# Patient Record
Sex: MALE | Race: WHITE | NOT HISPANIC OR LATINO | Employment: FULL TIME | URBAN - METROPOLITAN AREA
[De-identification: names, ages, dates, MRNs, and addresses within clinical notes are randomized per-mention and may not be internally consistent; named-entity substitution may affect disease eponyms.]

---

## 2023-12-26 LAB
BASOPHILS # BLD AUTO: 0 10E3/UL (ref 0–0.2)
BASOPHILS NFR BLD AUTO: 0 %
EOSINOPHIL # BLD AUTO: 0 10E3/UL (ref 0–0.7)
EOSINOPHIL NFR BLD AUTO: 0 %
ERYTHROCYTE [DISTWIDTH] IN BLOOD BY AUTOMATED COUNT: 11.8 % (ref 10–15)
HCT VFR BLD AUTO: 49.5 % (ref 40–53)
HGB BLD-MCNC: 17.3 G/DL (ref 13.3–17.7)
IMM GRANULOCYTES # BLD: 0 10E3/UL
IMM GRANULOCYTES NFR BLD: 0 %
LIPASE SERPL-CCNC: 14 U/L (ref 13–60)
LYMPHOCYTES # BLD AUTO: 1.6 10E3/UL (ref 0.8–5.3)
LYMPHOCYTES NFR BLD AUTO: 13 %
MCH RBC QN AUTO: 30.4 PG (ref 26.5–33)
MCHC RBC AUTO-ENTMCNC: 34.9 G/DL (ref 31.5–36.5)
MCV RBC AUTO: 87 FL (ref 78–100)
MONOCYTES # BLD AUTO: 1.3 10E3/UL (ref 0–1.3)
MONOCYTES NFR BLD AUTO: 11 %
NEUTROPHILS # BLD AUTO: 9.1 10E3/UL (ref 1.6–8.3)
NEUTROPHILS NFR BLD AUTO: 76 %
NRBC # BLD AUTO: 0 10E3/UL
NRBC BLD AUTO-RTO: 0 /100
PLATELET # BLD AUTO: 240 10E3/UL (ref 150–450)
RBC # BLD AUTO: 5.69 10E6/UL (ref 4.4–5.9)
WBC # BLD AUTO: 12.1 10E3/UL (ref 4–11)

## 2023-12-26 PROCEDURE — 83690 ASSAY OF LIPASE: CPT | Performed by: EMERGENCY MEDICINE

## 2023-12-26 PROCEDURE — 85048 AUTOMATED LEUKOCYTE COUNT: CPT | Performed by: EMERGENCY MEDICINE

## 2023-12-26 PROCEDURE — 36415 COLL VENOUS BLD VENIPUNCTURE: CPT | Performed by: EMERGENCY MEDICINE

## 2023-12-26 PROCEDURE — 250N000011 HC RX IP 250 OP 636: Performed by: EMERGENCY MEDICINE

## 2023-12-26 PROCEDURE — 80053 COMPREHEN METABOLIC PANEL: CPT | Performed by: EMERGENCY MEDICINE

## 2023-12-26 PROCEDURE — 96374 THER/PROPH/DIAG INJ IV PUSH: CPT | Mod: 59

## 2023-12-26 PROCEDURE — 99285 EMERGENCY DEPT VISIT HI MDM: CPT | Mod: 25

## 2023-12-26 PROCEDURE — 85025 COMPLETE CBC W/AUTO DIFF WBC: CPT | Performed by: EMERGENCY MEDICINE

## 2023-12-26 RX ORDER — ONDANSETRON 2 MG/ML
4 INJECTION INTRAMUSCULAR; INTRAVENOUS ONCE
Status: COMPLETED | OUTPATIENT
Start: 2023-12-26 | End: 2023-12-26

## 2023-12-26 RX ADMIN — ONDANSETRON 4 MG: 2 INJECTION INTRAMUSCULAR; INTRAVENOUS at 23:22

## 2023-12-27 ENCOUNTER — APPOINTMENT (OUTPATIENT)
Dept: CT IMAGING | Facility: CLINIC | Age: 33
End: 2023-12-27
Attending: EMERGENCY MEDICINE
Payer: COMMERCIAL

## 2023-12-27 ENCOUNTER — HOSPITAL ENCOUNTER (OUTPATIENT)
Facility: CLINIC | Age: 33
Setting detail: OBSERVATION
Discharge: HOME | End: 2023-12-28
Attending: EMERGENCY MEDICINE | Admitting: INTERNAL MEDICINE
Payer: COMMERCIAL

## 2023-12-27 DIAGNOSIS — K56.609 SBO (SMALL BOWEL OBSTRUCTION) (H): ICD-10-CM

## 2023-12-27 LAB
ALBUMIN SERPL BCG-MCNC: 4.6 G/DL (ref 3.5–5.2)
ALP SERPL-CCNC: 66 U/L (ref 40–150)
ALT SERPL W P-5'-P-CCNC: 22 U/L (ref 0–70)
ANION GAP SERPL CALCULATED.3IONS-SCNC: 13 MMOL/L (ref 7–15)
AST SERPL W P-5'-P-CCNC: 33 U/L (ref 0–45)
BILIRUB SERPL-MCNC: 0.7 MG/DL
BUN SERPL-MCNC: 14.5 MG/DL (ref 6–20)
CALCIUM SERPL-MCNC: 9.6 MG/DL (ref 8.6–10)
CHLORIDE SERPL-SCNC: 96 MMOL/L (ref 98–107)
CREAT SERPL-MCNC: 0.88 MG/DL (ref 0.67–1.17)
DEPRECATED HCO3 PLAS-SCNC: 27 MMOL/L (ref 22–29)
EGFRCR SERPLBLD CKD-EPI 2021: >90 ML/MIN/1.73M2
FLUAV RNA SPEC QL NAA+PROBE: NEGATIVE
FLUBV RNA RESP QL NAA+PROBE: NEGATIVE
GLUCOSE SERPL-MCNC: 104 MG/DL (ref 70–99)
HCO3 BLDV-SCNC: 28 MMOL/L (ref 21–28)
HOLD SPECIMEN: NORMAL
HOLD SPECIMEN: NORMAL
LACTATE BLD-SCNC: 1.6 MMOL/L
PCO2 BLDV: 53 MM HG (ref 40–50)
PH BLDV: 7.34 [PH] (ref 7.32–7.43)
PO2 BLDV: 24 MM HG (ref 25–47)
POTASSIUM SERPL-SCNC: 4.4 MMOL/L (ref 3.4–5.3)
PROT SERPL-MCNC: 7.8 G/DL (ref 6.4–8.3)
RSV RNA SPEC NAA+PROBE: NEGATIVE
SAO2 % BLDV: 39 % (ref 94–100)
SARS-COV-2 RNA RESP QL NAA+PROBE: NEGATIVE
SODIUM SERPL-SCNC: 136 MMOL/L (ref 135–145)

## 2023-12-27 PROCEDURE — 96361 HYDRATE IV INFUSION ADD-ON: CPT

## 2023-12-27 PROCEDURE — 120N000004 HC R&B MS OVERFLOW

## 2023-12-27 PROCEDURE — 96376 TX/PRO/DX INJ SAME DRUG ADON: CPT

## 2023-12-27 PROCEDURE — 258N000003 HC RX IP 258 OP 636: Performed by: INTERNAL MEDICINE

## 2023-12-27 PROCEDURE — 250N000011 HC RX IP 250 OP 636: Performed by: EMERGENCY MEDICINE

## 2023-12-27 PROCEDURE — 96375 TX/PRO/DX INJ NEW DRUG ADDON: CPT

## 2023-12-27 PROCEDURE — 99221 1ST HOSP IP/OBS SF/LOW 40: CPT | Performed by: INTERNAL MEDICINE

## 2023-12-27 PROCEDURE — 87637 SARSCOV2&INF A&B&RSV AMP PRB: CPT | Performed by: EMERGENCY MEDICINE

## 2023-12-27 PROCEDURE — 99222 1ST HOSP IP/OBS MODERATE 55: CPT | Performed by: SURGERY

## 2023-12-27 PROCEDURE — 250N000009 HC RX 250: Performed by: EMERGENCY MEDICINE

## 2023-12-27 PROCEDURE — 82803 BLOOD GASES ANY COMBINATION: CPT

## 2023-12-27 PROCEDURE — 99207 PR NO BILLABLE SERVICE THIS VISIT: CPT | Performed by: INTERNAL MEDICINE

## 2023-12-27 PROCEDURE — 258N000003 HC RX IP 258 OP 636: Performed by: EMERGENCY MEDICINE

## 2023-12-27 PROCEDURE — 74177 CT ABD & PELVIS W/CONTRAST: CPT

## 2023-12-27 RX ORDER — IBUPROFEN 200 MG
200 TABLET ORAL PRN
COMMUNITY

## 2023-12-27 RX ORDER — ACETAMINOPHEN 325 MG/1
650 TABLET ORAL EVERY 4 HOURS PRN
Status: DISCONTINUED | OUTPATIENT
Start: 2023-12-27 | End: 2023-12-28 | Stop reason: HOSPADM

## 2023-12-27 RX ORDER — PROCHLORPERAZINE MALEATE 10 MG
10 TABLET ORAL EVERY 6 HOURS PRN
Status: DISCONTINUED | OUTPATIENT
Start: 2023-12-27 | End: 2023-12-28 | Stop reason: HOSPADM

## 2023-12-27 RX ORDER — MORPHINE SULFATE 2 MG/ML
2 INJECTION, SOLUTION INTRAMUSCULAR; INTRAVENOUS
Status: DISCONTINUED | OUTPATIENT
Start: 2023-12-27 | End: 2023-12-28 | Stop reason: HOSPADM

## 2023-12-27 RX ORDER — CALCIUM CARBONATE 500 MG/1
1000 TABLET, CHEWABLE ORAL 2 TIMES DAILY PRN
Status: DISCONTINUED | OUTPATIENT
Start: 2023-12-27 | End: 2023-12-28 | Stop reason: HOSPADM

## 2023-12-27 RX ORDER — NALOXONE HYDROCHLORIDE 0.4 MG/ML
0.4 INJECTION, SOLUTION INTRAMUSCULAR; INTRAVENOUS; SUBCUTANEOUS
Status: DISCONTINUED | OUTPATIENT
Start: 2023-12-27 | End: 2023-12-28 | Stop reason: HOSPADM

## 2023-12-27 RX ORDER — AMOXICILLIN 250 MG
2 CAPSULE ORAL 2 TIMES DAILY PRN
Status: DISCONTINUED | OUTPATIENT
Start: 2023-12-27 | End: 2023-12-28 | Stop reason: HOSPADM

## 2023-12-27 RX ORDER — ONDANSETRON 2 MG/ML
4 INJECTION INTRAMUSCULAR; INTRAVENOUS EVERY 6 HOURS PRN
Status: DISCONTINUED | OUTPATIENT
Start: 2023-12-27 | End: 2023-12-27

## 2023-12-27 RX ORDER — OXYCODONE HYDROCHLORIDE 5 MG/1
5 TABLET ORAL EVERY 4 HOURS PRN
Status: DISCONTINUED | OUTPATIENT
Start: 2023-12-27 | End: 2023-12-28 | Stop reason: HOSPADM

## 2023-12-27 RX ORDER — SODIUM CHLORIDE 9 MG/ML
INJECTION, SOLUTION INTRAVENOUS CONTINUOUS
Status: DISCONTINUED | OUTPATIENT
Start: 2023-12-27 | End: 2023-12-27

## 2023-12-27 RX ORDER — HYDROMORPHONE HYDROCHLORIDE 1 MG/ML
0.3 INJECTION, SOLUTION INTRAMUSCULAR; INTRAVENOUS; SUBCUTANEOUS ONCE
Status: COMPLETED | OUTPATIENT
Start: 2023-12-27 | End: 2023-12-27

## 2023-12-27 RX ORDER — MORPHINE SULFATE 2 MG/ML
1 INJECTION, SOLUTION INTRAMUSCULAR; INTRAVENOUS
Status: DISCONTINUED | OUTPATIENT
Start: 2023-12-27 | End: 2023-12-28 | Stop reason: HOSPADM

## 2023-12-27 RX ORDER — ONDANSETRON 2 MG/ML
4 INJECTION INTRAMUSCULAR; INTRAVENOUS ONCE
Status: COMPLETED | OUTPATIENT
Start: 2023-12-27 | End: 2023-12-27

## 2023-12-27 RX ORDER — IOPAMIDOL 755 MG/ML
500 INJECTION, SOLUTION INTRAVASCULAR ONCE
Status: COMPLETED | OUTPATIENT
Start: 2023-12-27 | End: 2023-12-27

## 2023-12-27 RX ORDER — ONDANSETRON 4 MG/1
4 TABLET, ORALLY DISINTEGRATING ORAL EVERY 6 HOURS PRN
Status: DISCONTINUED | OUTPATIENT
Start: 2023-12-27 | End: 2023-12-28 | Stop reason: HOSPADM

## 2023-12-27 RX ORDER — NALOXONE HYDROCHLORIDE 0.4 MG/ML
0.2 INJECTION, SOLUTION INTRAMUSCULAR; INTRAVENOUS; SUBCUTANEOUS
Status: DISCONTINUED | OUTPATIENT
Start: 2023-12-27 | End: 2023-12-28 | Stop reason: HOSPADM

## 2023-12-27 RX ORDER — PROCHLORPERAZINE 25 MG
25 SUPPOSITORY, RECTAL RECTAL EVERY 12 HOURS PRN
Status: DISCONTINUED | OUTPATIENT
Start: 2023-12-27 | End: 2023-12-28 | Stop reason: HOSPADM

## 2023-12-27 RX ORDER — ONDANSETRON 2 MG/ML
4 INJECTION INTRAMUSCULAR; INTRAVENOUS EVERY 6 HOURS PRN
Status: DISCONTINUED | OUTPATIENT
Start: 2023-12-27 | End: 2023-12-28 | Stop reason: HOSPADM

## 2023-12-27 RX ORDER — HYDROMORPHONE HYDROCHLORIDE 1 MG/ML
0.3 INJECTION, SOLUTION INTRAMUSCULAR; INTRAVENOUS; SUBCUTANEOUS
Status: DISCONTINUED | OUTPATIENT
Start: 2023-12-27 | End: 2023-12-27

## 2023-12-27 RX ORDER — POLYETHYLENE GLYCOL 3350 17 G/17G
17 POWDER, FOR SOLUTION ORAL DAILY
Status: DISCONTINUED | OUTPATIENT
Start: 2023-12-27 | End: 2023-12-28 | Stop reason: HOSPADM

## 2023-12-27 RX ORDER — LIDOCAINE 40 MG/G
CREAM TOPICAL
Status: DISCONTINUED | OUTPATIENT
Start: 2023-12-27 | End: 2023-12-28 | Stop reason: HOSPADM

## 2023-12-27 RX ORDER — AMOXICILLIN 250 MG
1 CAPSULE ORAL 2 TIMES DAILY PRN
Status: DISCONTINUED | OUTPATIENT
Start: 2023-12-27 | End: 2023-12-28 | Stop reason: HOSPADM

## 2023-12-27 RX ORDER — HYDROMORPHONE HYDROCHLORIDE 1 MG/ML
0.5 INJECTION, SOLUTION INTRAMUSCULAR; INTRAVENOUS; SUBCUTANEOUS ONCE
Status: DISCONTINUED | OUTPATIENT
Start: 2023-12-27 | End: 2023-12-27

## 2023-12-27 RX ADMIN — SODIUM CHLORIDE: 9 INJECTION, SOLUTION INTRAVENOUS at 08:40

## 2023-12-27 RX ADMIN — SODIUM CHLORIDE 1000 ML: 9 INJECTION, SOLUTION INTRAVENOUS at 04:07

## 2023-12-27 RX ADMIN — HYDROMORPHONE HYDROCHLORIDE 0.3 MG: 1 INJECTION, SOLUTION INTRAMUSCULAR; INTRAVENOUS; SUBCUTANEOUS at 06:16

## 2023-12-27 RX ADMIN — SODIUM CHLORIDE 56 ML: 9 INJECTION, SOLUTION INTRAVENOUS at 04:37

## 2023-12-27 RX ADMIN — IOPAMIDOL 100 ML: 755 INJECTION, SOLUTION INTRAVENOUS at 04:37

## 2023-12-27 RX ADMIN — FAMOTIDINE 20 MG: 10 INJECTION, SOLUTION INTRAVENOUS at 04:04

## 2023-12-27 RX ADMIN — ONDANSETRON 4 MG: 2 INJECTION INTRAMUSCULAR; INTRAVENOUS at 04:14

## 2023-12-27 RX ADMIN — HYDROMORPHONE HYDROCHLORIDE 0.3 MG: 1 INJECTION, SOLUTION INTRAMUSCULAR; INTRAVENOUS; SUBCUTANEOUS at 04:14

## 2023-12-27 ASSESSMENT — ACTIVITIES OF DAILY LIVING (ADL)
ADLS_ACUITY_SCORE: 35

## 2023-12-27 NOTE — PROGRESS NOTES
Patient seen and examined.  Chart reviewed.  Please see admission history and physical by Dr. Roberts from earlier today for details.

## 2023-12-27 NOTE — CONSULTS
Chief complaint:  Abdominal pain    HPI:  This patient is a 33 year old male who presents with abdominal pain over the last couple of days.  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  He has been doing generally well, and is not aware of any previously diagnosed small bowel obstructions.  He has had a couple of episodes of pain which resolved on their own.  The patient now states that he has had a bowel movement and passed gas.  His pain has essentially resolved.    Past Medical History:   has a past medical history of Congenital malrotation of intestine (H28) and Gastroesophageal reflux disease without esophagitis.    Past Surgical History:  Past Surgical History:   Procedure Laterality Date    ABDOMEN SURGERY      congenital malrotation of the bowel repaired as an infant        Social History:  Social History     Socioeconomic History    Marital status:      Spouse name: Not on file    Number of children: Not on file    Years of education: Not on file    Highest education level: Not on file   Occupational History    Not on file   Tobacco Use    Smoking status: Never    Smokeless tobacco: Not on file   Substance and Sexual Activity    Alcohol use: Yes     Comment: occasionally    Drug use: Not on file    Sexual activity: Not on file   Other Topics Concern    Not on file   Social History Narrative    Not on file     Social Determinants of Health     Financial Resource Strain: Not on file   Food Insecurity: Not on file   Transportation Needs: Not on file   Physical Activity: Not on file   Stress: Not on file   Social Connections: Not on file   Interpersonal Safety: Not on file   Housing Stability: Not on file        Family History:  Family History   Problem Relation Age of Onset    Intestinal malrotation Father        Review of Systems:  The 10 point Review of Systems is negative other than noted in the HPI and above.    Physical Exam:  General - This is a well  developed, well nourished male in no apparent distress.  HEENT - Normocephalic, atraumatic.  No scleral icterus.  Neck - supple without masses  Lungs - clear to ascultation.    Heart - Regular rate & rhythm without murmur  Abdomen:   soft, non-distended without significant tenderness.  Bowel sounds are slightly high-pitched.    Extremities - warm without edema  Neurologic - nonfocal    Relevant labs:  White blood cell count 12,100.  Hemoglobin 17.3.    Imaging:  CT scan shows findings consistent with a small bowel obstruction.  Malrotation is evident.  There is a concern for possible internal hernia.    Assessment and Plan:  This is a patient with presenting symptoms of small bowel obstruction which now seems to have resolved.  We discussed the option of diagnostic laparoscopy, evaluation with Gastrografin challenge or starting a clear liquid diet.  The patient's preference is to start a liquid diet in the hopes of advancing to at least full liquids by tomorrow.  If that is the case, he should hopefully be able to discharge tomorrow.  We will follow the patient with you.  We did discuss trying to avoid very high residue foods such as corn and black beans in the near future.      Damir Schaefer MD  [facility]

## 2023-12-27 NOTE — ED NOTES
VSS, patient denies N/V at this time. Last emesis 2200 last night. Pain 0/10. Last dilaudid 0616. Patient reports he passed gas at 0800 today. Patient remains NPO except ice chips. NS running at 100ml/hr.

## 2023-12-27 NOTE — ED NOTES
"[facility]  ED Nurse Handoff Report    ED Chief complaint: Abdominal Pain and Vomiting  . ED Diagnosis:   Final diagnoses:   SBO (small bowel obstruction) (H)       Allergies: No Known Allergies    Code Status: Full Code    Activity level - Baseline/Home:  independent.  Activity Level - Current:   independent.   Lift room needed: No.   Bariatric: No   Needed: No   Isolation: No.   Infection: Not Applicable.     Respiratory status: Room air    Vital Signs (within 30 minutes):   Vitals:    12/26/23 2312 12/27/23 0400 12/27/23 0416   BP: 130/89 (!) 134/93 (!) 127/92   BP Location: Right arm     Pulse: 75 61    Resp: 18     Temp: 98.4  F (36.9  C)     TempSrc: Oral     SpO2: 100% 100% 99%   Weight: 99.8 kg (220 lb)     Height: 1.854 m (6' 1\")         Cardiac Rhythm:  ,      Pain level:    Patient confused: No.   Patient Falls Risk: patient and family education.   Elimination Status: Has voided     Patient Report - Initial Complaint: Epigastric pain, N/V.   Focused Assessment: Abdominal Pain and Vomiting        HPI   Raji Barker is a 33 year old male presenting with predominantly complaints of abdominal pain and vomiting.  Patient reports for roughly the past 2 days having epigastric pain that has become more generalized.  He describes the pain as more aching and cramping.  He notes taking Tylenol, Tums, Pepto-Bismol and initiating omeprazole twice daily yesterday though without significant improvements.  He reports accompanying nausea and nonbilious, nonbloody emesis though reports roughly 30 episodes of emesis since onset of symptoms.  He denies any fever, chills, sore throat, cough, dyspnea, chest pain, diarrhea, constipation, urinary or testicular pain.  Patient does admit to using NSAIDs roughly 3 times a week over the past month though denies any history of PUD, daily alcohol use, sick contacts, suspicious foods or gallstones.      Abnormal Results:   Labs Ordered and Resulted " from Time of ED Arrival to Time of ED Departure   COMPREHENSIVE METABOLIC PANEL - Abnormal       Result Value    Sodium 136      Potassium 4.4      Carbon Dioxide (CO2) 27      Anion Gap 13      Urea Nitrogen 14.5      Creatinine 0.88      GFR Estimate >90      Calcium 9.6      Chloride 96 (*)     Glucose 104 (*)     Alkaline Phosphatase 66      AST 33      ALT 22      Protein Total 7.8      Albumin 4.6      Bilirubin Total 0.7     CBC WITH PLATELETS AND DIFFERENTIAL - Abnormal    WBC Count 12.1 (*)     RBC Count 5.69      Hemoglobin 17.3      Hematocrit 49.5      MCV 87      MCH 30.4      MCHC 34.9      RDW 11.8      Platelet Count 240      % Neutrophils 76      % Lymphocytes 13      % Monocytes 11      % Eosinophils 0      % Basophils 0      % Immature Granulocytes 0      NRBCs per 100 WBC 0      Absolute Neutrophils 9.1 (*)     Absolute Lymphocytes 1.6      Absolute Monocytes 1.3      Absolute Eosinophils 0.0      Absolute Basophils 0.0      Absolute Immature Granulocytes 0.0      Absolute NRBCs 0.0     ISTAT GASES LACTATE VENOUS POCT - Abnormal    Lactic Acid POCT 1.6      Bicarbonate Venous POCT 28      O2 Sat, Venous POCT 39 (*)     pCO2 Venous POCT 53 (*)     pH Venous POCT 7.34      pO2 Venous POCT 24 (*)    LIPASE - Normal    Lipase 14     INFLUENZA A/B, RSV, & SARS-COV2 PCR - Normal    Influenza A PCR Negative      Influenza B PCR Negative      RSV PCR Negative      SARS CoV2 PCR Negative     ISTAT GASES LACTATE VENOUS POCT        CT Abdomen Pelvis w Contrast   Final Result   IMPRESSION:    Findings consistent with small bowel obstruction which may be on the basis of an internal hernia. There is evidence for underlying congenital malrotation of bowel. Mesenteric edema and small amount of ascites. No perforation or abscess.      I discussed the findings with Dr. Barnett of the ED at 5:15 AM on 12/27/2023.           Treatments provided: See MAR  Family Comments: N/A  OBS brochure/video discussed/provided to  patient:  N/A  ED Medications:   Medications   ondansetron (ZOFRAN) injection 4 mg (has no administration in time range)   HYDROmorphone (PF) (DILAUDID) injection 0.3 mg (has no administration in time range)   ondansetron (ZOFRAN) injection 4 mg (4 mg Intravenous $Given 12/26/23 2322)   famotidine (PEPCID) injection 20 mg (20 mg Intravenous $Given 12/27/23 0404)   ondansetron (ZOFRAN) injection 4 mg (4 mg Intravenous $Given 12/27/23 0414)   sodium chloride 0.9% BOLUS 1,000 mL (0 mLs Intravenous Stopped 12/27/23 0610)   HYDROmorphone (PF) (DILAUDID) injection 0.3 mg (0.3 mg Intravenous $Given 12/27/23 0414)   CT Scan Flush (56 mLs Intravenous $Given 12/27/23 0437)   iopamidol (ISOVUE-370) solution 500 mL (100 mLs Intravenous $Given 12/27/23 0437)       Drips infusing:  No  For the majority of the shift this patient was Green.   Interventions performed were N/A.    Sepsis treatment initiated: No    Cares/treatment/interventions/medications to be completed following ED care: Pain management, continue to monitor.     ED Nurse Name: Marlon D Reyes, RN  6:12 AM     RECEIVING UNIT ED HANDOFF REVIEW    Above ED Nurse Handoff Report was reviewed: Yes  Reviewed by: Hue Benitez RN on December 27, 2023 at 8:58 AM

## 2023-12-27 NOTE — PHARMACY-ADMISSION MEDICATION HISTORY
Pharmacist Admission Medication History    Admission medication history is complete. The information provided in this note is only as accurate as the sources available at the time of the update.    Information Source(s): Patient via in-person    Pertinent Information: None    Changes made to PTA medication list:  Added: all meds  Deleted: None  Changed: None    Medication Affordability:  Not including over the counter (OTC) medications, was there a time in the past 3 months when you did not take your medications as prescribed because of cost?: No    Allergies reviewed with patient and updates made in EHR: yes    Medication History Completed By: Brea Stein [facility] 12/27/2023 10:39 AM    Prior to Admission medications    Medication Sig Last Dose Taking? Auth Provider Long Term End Date   ibuprofen (ADVIL/MOTRIN) 200 MG tablet Take 200 mg by mouth as needed for pain prn at prn Yes Unknown, Entered By History     omeprazole (PRILOSEC) 20 MG DR capsule Take 20 mg by mouth daily as needed prn at prn Yes Unknown, Entered By History

## 2023-12-27 NOTE — ED TRIAGE NOTES
Pt. Presents to ED with complaints of 2 days of epigastric pain with brown vomit. Able to keep only water down. Pt. Reports he had this happen to him before 3 weeks ago and several years ago. Denies fevers. Denies blood thinners but does take ibuprofen 2-3 times/week. Denies blood, blood clots, or coffee ground emesis. Denies diarrhea, reports normal stools.

## 2023-12-27 NOTE — ED PROVIDER NOTES
"  History     Chief Complaint:  Abdominal Pain and Vomiting       HPI   Raji Barker is a 33 year old male presenting with predominantly complaints of abdominal pain and vomiting.  Patient reports for roughly the past 2 days having epigastric pain that has become more generalized.  He describes the pain as more aching and cramping.  He notes taking Tylenol, Tums, Pepto-Bismol and initiating omeprazole twice daily yesterday though without significant improvements.  He reports accompanying nausea and nonbilious, nonbloody emesis though reports roughly 30 episodes of emesis since onset of symptoms.  He denies any fever, chills, sore throat, cough, dyspnea, chest pain, diarrhea, constipation, urinary or testicular pain.  Patient does admit to using NSAIDs roughly 3 times a week over the past month though denies any history of PUD, daily alcohol use, sick contacts, suspicious foods or gallstones.  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      Independent Historian:   None - Patient Only    Review of External Notes:   none       Medications:    No current outpatient medications on file.      Past Medical History:    Congenital intestinal malrotation    Past Surgical History:    Congenital intestinal malrotation surgery    Physical Exam   Patient Vitals for the past 24 hrs:   BP Temp Temp src Pulse Resp SpO2 Height Weight   12/26/23 2312 130/89 98.4  F (36.9  C) Oral 75 18 100 % 1.854 m (6' 1\") 99.8 kg (220 lb)        Physical Exam  Nursing note and vitals reviewed.  Constitutional: Well nourished.  Eyes: Conjunctiva normal.  Pupils are equal, round, and reactive to light.   ENT: Nose normal. Mucous membranes pink and moist.  No posterior oropharyngeal erythema/exudate. Uvula midline.   Neck: Normal range of motion.  CVS: Normal rate, regular rhythm.  Normal heart sounds.   Pulmonary: Lungs clear to auscultation bilaterally. No wheezes/rales/rhonchi.  GI: Abdomen soft. Mild generalized tenderness. No " rigidity or guarding.  No CVA tenderness  MSK: Moves all extremities  Neuro: Alert. Follows simple commands.  Skin: Skin is warm and dry. No rash noted.   Psychiatric: Normal affect.         Emergency Department Course     Imaging:  CT Abdomen Pelvis w Contrast   Final Result   IMPRESSION:    Findings consistent with small bowel obstruction which may be on the basis of an internal hernia. There is evidence for underlying congenital malrotation of bowel. Mesenteric edema and small amount of ascites. No perforation or abscess.      I discussed the findings with Dr. Barnett of the ED at 5:15 AM on 12/27/2023.              Laboratory:  Labs Ordered and Resulted from Time of ED Arrival to Time of ED Departure   COMPREHENSIVE METABOLIC PANEL - Abnormal       Result Value    Sodium 136      Potassium 4.4      Carbon Dioxide (CO2) 27      Anion Gap 13      Urea Nitrogen 14.5      Creatinine 0.88      GFR Estimate >90      Calcium 9.6      Chloride 96 (*)     Glucose 104 (*)     Alkaline Phosphatase 66      AST 33      ALT 22      Protein Total 7.8      Albumin 4.6      Bilirubin Total 0.7     CBC WITH PLATELETS AND DIFFERENTIAL - Abnormal    WBC Count 12.1 (*)     RBC Count 5.69      Hemoglobin 17.3      Hematocrit 49.5      MCV 87      MCH 30.4      MCHC 34.9      RDW 11.8      Platelet Count 240      % Neutrophils 76      % Lymphocytes 13      % Monocytes 11      % Eosinophils 0      % Basophils 0      % Immature Granulocytes 0      NRBCs per 100 WBC 0      Absolute Neutrophils 9.1 (*)     Absolute Lymphocytes 1.6      Absolute Monocytes 1.3      Absolute Eosinophils 0.0      Absolute Basophils 0.0      Absolute Immature Granulocytes 0.0      Absolute NRBCs 0.0     LIPASE - Normal    Lipase 14     INFLUENZA A/B, RSV, & SARS-COV2 PCR - Normal    Influenza A PCR Negative      Influenza B PCR Negative      RSV PCR Negative      SARS CoV2 PCR Negative          Emergency Department Course & Assessments:              Interventions:  Medications   HYDROmorphone (PF) (DILAUDID) injection 0.5 mg (has no administration in time range)   ondansetron (ZOFRAN) injection 4 mg (has no administration in time range)   sodium chloride 0.9% BOLUS 1,000 mL (1,000 mLs Intravenous $New Bag 12/27/23 0402)   ondansetron (ZOFRAN) injection 4 mg (4 mg Intravenous $Given 12/26/23 3565)   famotidine (PEPCID) injection 20 mg (20 mg Intravenous $Given 12/27/23 3987)            Consultations/Discussion of Management or Tests:  5:43 AM I spoke to general surgery, Dr. Hutchison who states no need for emergent surgaical intervention at this time  5:54 AM I spoke to Dr. Roberts, hospitalist      Social Determinants of Health affecting care:   None    Disposition:  The patient is admitted to Dr. Roberts    Impression & Plan      Medical Decision Making:  Patient is a 33-year-old male presenting with predominant complaints of abdominal pain and vomiting.  He is nontoxic on arrival.  Unfortunately he had a prolonged wait secondary to large patient volumes.  Labs with mild leukocytosis though lactate normal.  No profound anemia or significant electrolyte derangements.  LFTs/lipase normal.  He underwent a CT which does suggest concerns for small bowel obstruction.  He did not have any recurrent emesis during his time in the ED.  We have held off on NG tube placement at this point in time.  His pain was controlled.  I did speak to general surgery particularly given history of malrotation with questionable internal hernia though they do not feel emergent surgical intervention is warranted at this point in time and feel comfortable seeing patient upon consultation.  Patient is pending admission at this point in time and my partner will take the phone call.      Diagnosis:    ICD-10-CM    1. SBO (small bowel obstruction) (H)  K56.609            Discharge Medications:  New Prescriptions    No medications on file          12/27/2023   Michelle Barnett, DO           Michelle Barnett, DO  12/27/23 0554

## 2023-12-27 NOTE — H&P
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   2 days ago he had sudden onset of epigastric pain without radiation, followed by profuse n/v.  He tried sched APAP, tums, PPI without relief and symptoms persisted.  He had a normal bm yesterday am. No F/C     ED VSS and he is afebrile  CMP wnl x mildly low chloride.    CBC with leukocytosis 12.1 with neutrophilia    Covid/flu/rsv negative     CT A/P with contrast:    Findings consistent with small bowel obstruction which may be on the basis of an internal hernia. There is evidence for underlying congenital malrotation of bowel. Mesenteric edema and small amount of ascites. No perforation or abscess.     Dr Barnett spoke with Dr Li who recommended admission and he will see him this morning    Problem List:   SBO in the setting of prior repair of congenital malrotation of the intestine  ? Internal hernia   NG has not been placed and he is not nauseated.  Exam with moderate distension and minimal bowel sounds.  Can't recall the last time he passed gas.  Had normal bm yesterday am   -NPO x meds, will consider ice chips  -IVF/IV pain and nausea medications  -orders in for prn NG  -surgery consult        DVT Prophylaxis: Pneumatic Compression Devices  Code Status: Full Code  Functional Status: independent  Muniz: not needed  Access:   PIV            Time spent 40 minutes reviewing epic including notes/labs/prior hx, current medications.  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   2 days ago he had sudden onset of epigastric pain without radiation, followed by profuse n/v.  He tried sched APAP, tums, PPI without relief and symptoms persisted.  He had a normal bm yesterday am. No F/C     ED VSS and he is afebrile  CMP wnl x mildly low chloride.    CBC with leukocytosis 12.1 with neutrophilia    Covid/flu/rsv negative     CT A/P with contrast:    Findings consistent with small bowel obstruction which may be on the basis of an internal hernia. There is evidence for underlying congenital malrotation of bowel. Mesenteric edema and small amount of ascites. No perforation or abscess.     Dr Barnett spoke with Dr Li who recommended admission and he will see him this morning      The history is obtained in discussion with the ER provider Dr Barnett and the patient with excellent reliability      Epic and Care everywhere were extensively reviewed        Past Medical History:     Past Medical History:   Diagnosis Date    Congenital malrotation of intestine (H28)     repaired in the first week of life    Gastroesophageal reflux disease without esophagitis              Past Surgical History:     Past Surgical History:   Procedure Laterality Date    ABDOMEN SURGERY      congenital malrotation of the bowel repaired as an infant             Social History:     Social History     Tobacco Use    Smoking status: Never    Smokeless tobacco: Not on file   Substance Use Topics    Alcohol use: Yes     Comment: occasionally             Family History:     Family History   Problem Relation Age of Onset    Intestinal  "malrotation Father             Allergies:   No Known Allergies          Medications:   Otc nsaids for headaches a couple times per week  Otc PPI prn for gerd             Review of Systems:     A Comprehensive greater than 10 system review of systems was carried out.  Pertinent positives and negatives are noted above.  Otherwise negative for contributory information.           Physical Exam:   Blood pressure 108/65, pulse 68, temperature 98.4  F (36.9  C), temperature source Oral, resp. rate 18, height 1.854 m (6' 1\"), weight 99.8 kg (220 lb), SpO2 99%.  Exam:    General:  Pleasant nad looks stated age  HEENT:  Head nc/at sclera clear PERRL  Neck is supple  Lungs: cta b nl effort   CV:  RRR no m/r/g no le edema  Abd:  mod distension, firm distension in the epigastric region, diffuse mild ttp but with voluntary guarding in the epigastric region.  Bowel sounds are quiet   Neuro:  Cn 2-12 grossly intact and hurtado  Alert and oriented affect appropriate   Skin:  W/d no c/c         Data:     Results for orders placed or performed during the hospital encounter of 12/27/23   CT Abdomen Pelvis w Contrast     Status: None    Narrative    EXAM: CT ABDOMEN PELVIS W CONTRAST  LOCATION: [facility]  DATE: 12/27/2023    INDICATION: abdominal pain, vomiting  COMPARISON: None.  TECHNIQUE: CT scan of the abdomen and pelvis was performed following injection of IV contrast. Multiplanar reformats were obtained. Dose reduction techniques were used.  CONTRAST: 100mL Isovue 370    FINDINGS:   LOWER CHEST: Normal.    HEPATOBILIARY: Normal.    PANCREAS: Normal.    SPLEEN: Normal.    ADRENAL GLANDS: Normal.    KIDNEYS/BLADDER: Normal.    BOWEL: A majority of small bowel is present in the right abdomen. The cecum and distal ileum are in the left abdomen. The transverse duodenum extends slightly to the left of midline and then immediately passes back to the right side of the abdomen. There   is mild twisting of the upper " abdominal mesenteric vessels with the superior mesenteric artery positioned to the right of the superior mesenteric vein. There are multiple loops of moderately dilated and fluid-filled mid small bowel with edema in the   mesentery and small amount of ascites. There is an abrupt transition to decompressed distal ileum in the left abdomen (images 84-90 of axial series 3). Dilated small bowel proximal to the transition point is fecalized. Stomach is not significantly   distended. No pneumatosis, pneumoperitoneum or abscess.    LYMPH NODES: Normal.    VASCULATURE: Mesenteric vasculature remains patent.    PELVIC ORGANS: Normal.    MUSCULOSKELETAL: Normal.      Impression    IMPRESSION:   Findings consistent with small bowel obstruction which may be on the basis of an internal hernia. There is evidence for underlying congenital malrotation of bowel. Mesenteric edema and small amount of ascites. No perforation or abscess.    I discussed the findings with Dr. Barnett of the ED at 5:15 AM on 12/27/2023.    Comprehensive metabolic panel     Status: Abnormal   Result Value Ref Range    Sodium 136 135 - 145 mmol/L    Potassium 4.4 3.4 - 5.3 mmol/L    Carbon Dioxide (CO2) 27 22 - 29 mmol/L    Anion Gap 13 7 - 15 mmol/L    Urea Nitrogen 14.5 6.0 - 20.0 mg/dL    Creatinine 0.88 0.67 - 1.17 mg/dL    GFR Estimate >90 >60 mL/min/1.73m2    Calcium 9.6 8.6 - 10.0 mg/dL    Chloride 96 (L) 98 - 107 mmol/L    Glucose 104 (H) 70 - 99 mg/dL    Alkaline Phosphatase 66 40 - 150 U/L    AST 33 0 - 45 U/L    ALT 22 0 - 70 U/L    Protein Total 7.8 6.4 - 8.3 g/dL    Albumin 4.6 3.5 - 5.2 g/dL    Bilirubin Total 0.7 <=1.2 mg/dL   Lipase     Status: Normal   Result Value Ref Range    Lipase 14 13 - 60 U/L   [place] Draw     Status: None    Narrative    The following orders were created for panel order [place] Draw.  Procedure                               Abnormality         Status                     ---------                                -----------         ------                     Extra Blue Top Tube[[id number]]                              Final result               Extra Red Top Tube[[id number]]                               Final result               Extra Green Top (Lithium...[[id number]]                                                 Extra Purple Top Tube[[id number]]                                                         Please view results for these tests on the individual orders.   CBC with platelets and differential     Status: Abnormal   Result Value Ref Range    WBC Count 12.1 (H) 4.0 - 11.0 10e3/uL    RBC Count 5.69 4.40 - 5.90 10e6/uL    Hemoglobin 17.3 13.3 - 17.7 g/dL    Hematocrit 49.5 40.0 - 53.0 %    MCV 87 78 - 100 fL    MCH 30.4 26.5 - 33.0 pg    MCHC 34.9 31.5 - 36.5 g/dL    RDW 11.8 10.0 - 15.0 %    Platelet Count 240 150 - 450 10e3/uL    % Neutrophils 76 %    % Lymphocytes 13 %    % Monocytes 11 %    % Eosinophils 0 %    % Basophils 0 %    % Immature Granulocytes 0 %    NRBCs per 100 WBC 0 <1 /100    Absolute Neutrophils 9.1 (H) 1.6 - 8.3 10e3/uL    Absolute Lymphocytes 1.6 0.8 - 5.3 10e3/uL    Absolute Monocytes 1.3 0.0 - 1.3 10e3/uL    Absolute Eosinophils 0.0 0.0 - 0.7 10e3/uL    Absolute Basophils 0.0 0.0 - 0.2 10e3/uL    Absolute Immature Granulocytes 0.0 <=0.4 10e3/uL    Absolute NRBCs 0.0 10e3/uL   Extra Blue Top Tube     Status: None   Result Value Ref Range    Hold Specimen JIC    Extra Red Top Tube     Status: None   Result Value Ref Range    Hold Specimen JIC    Symptomatic Influenza A/B, RSV, & SARS-CoV2 PCR (COVID-19) Nasopharyngeal     Status: Normal    Specimen: Nasopharyngeal; Swab   Result Value Ref Range    Influenza A PCR Negative Negative    Influenza B PCR Negative Negative    RSV PCR Negative Negative    SARS CoV2 PCR Negative Negative    Narrative    Testing was performed using the Xpert Xpress CoV2/Flu/RSV Assay on the [employer] Instrument. This test should be ordered for the detection of  SARS-CoV-2, influenza, and RSV viruses in individuals who meet clinical and/or epidemiological criteria. Test performance is unknown in asymptomatic patients. This test is for in vitro diagnostic use under the FDA EUA for laboratories certified under CLIA to perform high or moderate complexity testing. This test has not been FDA cleared or approved. A negative result does not rule out the presence of PCR inhibitors in the specimen or target RNA in concentration below the limit of detection for the assay. If only one viral target is positive but coinfection with multiple targets is suspected, the sample should be re-tested with another FDA cleared, approved, or authorized test, if coinfection would change clinical management. This test was validated by the [facility]. These laboratories are certified under the Clinical Laboratory Improvement Amendments of 1988 (CLIA-88) as qualified to perform high complexity laboratory testing.   iStat Gases (lactate) venous, POCT     Status: Abnormal   Result Value Ref Range    Lactic Acid POCT 1.6 <=2.0 mmol/L    Bicarbonate Venous POCT 28 21 - 28 mmol/L    O2 Sat, Venous POCT 39 (L) 94 - 100 %    pCO2 Venous POCT 53 (H) 40 - 50 mm Hg    pH Venous POCT 7.34 7.32 - 7.43    pO2 Venous POCT 24 (L) 25 - 47 mm Hg   CBC + differential     Status: Abnormal    Narrative    The following orders were created for panel order CBC + differential.  Procedure                               Abnormality         Status                     ---------                               -----------         ------                     CBC with platelets and d...[[id number]]  Abnormal            Final result                 Please view results for these tests on the individual orders.

## 2023-12-28 VITALS
BODY MASS INDEX: 29.16 KG/M2 | DIASTOLIC BLOOD PRESSURE: 72 MMHG | TEMPERATURE: 97.7 F | HEART RATE: 63 BPM | HEIGHT: 73 IN | WEIGHT: 220 LBS | SYSTOLIC BLOOD PRESSURE: 112 MMHG | RESPIRATION RATE: 16 BRPM | OXYGEN SATURATION: 96 %

## 2023-12-28 PROCEDURE — 99238 HOSP IP/OBS DSCHRG MGMT 30/<: CPT | Performed by: INTERNAL MEDICINE

## 2023-12-28 PROCEDURE — G0378 HOSPITAL OBSERVATION PER HR: HCPCS

## 2023-12-28 ASSESSMENT — ACTIVITIES OF DAILY LIVING (ADL)
ADLS_ACUITY_SCORE: 35

## 2023-12-28 NOTE — UTILIZATION REVIEW
"  Admission Status; Secondary Review Determination         Under the authority of the Utilization Management Committee, the utilization review process indicated a secondary review on the above patient.  The review outcome is based on review of the medical records, discussions with staff, and applying clinical experience noted on the date of the review.          (x) Observation Status Appropriate - This patient does not meet hospital inpatient criteria and is placed in observation status. If this patient's primary payer is Medicare and was admitted as an inpatient, Condition Code 44 should be used and patient status changed to \"observation\".     RATIONALE FOR DETERMINATION   33 year old male with a history of congenital intestinal malrotation of the SB s/p repair within the first week of life, GERD  admitted on 12/27/2023 with SBO.  Initially made NPO.  Started on continuous IV fluids.  Seen in consultation by general surgery.  Symptoms improving.  Started on clear liquid diet.  Symptoms of continue to improve.  Advanced to low fiber diet by time of discharge.  Continues to pass flatus.  Had normal bowel movement day of discharge.  Follow-up with general surgery within the next 4 weeks   The severity of illness, intensity of service provided, expected LOS and risk for adverse outcome make the care appropriate for further observation; however, doesn't meet criteria for hospital inpatient admission. Dr Caruso  notified of this determination.    This document was produced using voice recognition software.      The information on this document is developed by the utilization review team in order for the business office to ensure compliance.  This only denotes the appropriateness of proper admission status and does not reflect the quality of care rendered.         The definitions of Inpatient Status and Observation Status used in making the determination above are those provided in the CMS Coverage Manual, Chapter 1 and " Chapter 6, section 70.4.      Sincerely,     JERRICA GARCIA MD    System Medical Director  Utilization Management  [facility].

## 2023-12-28 NOTE — DISCHARGE SUMMARY
"[facility]  Hospitalist Discharge Summary      Date of Admission:  12/27/2023  Date of Discharge:  12/28/2023  Discharging Provider: Durga Caruso DO  Discharge Service: Hospitalist Service    Discharge Diagnoses   Small bowel obstruction.  History of congenital intestinal malrotation.  GERD.    Clinically Significant Risk Factors     # Overweight: Estimated body mass index is 29.03 kg/m  as calculated from the following:    Height as of this encounter: 1.854 m (6' 1\").    Weight as of this encounter: 99.8 kg (220 lb).       Follow-ups Needed After Discharge   Follow-up Appointments     Follow-up and recommended labs and tests       Follow-up with general surgery within 4 weeks.            Discharge Disposition   Discharged to home  Condition at discharge: Stable    Hospital Course   Raji A Lucero is a 33 year old male with a history of congenital intestinal malrotation of the SB s/p repair within the first week of life, GERD  admitted on 12/27/2023 with SBO.  Initially made NPO.  Started on continuous IV fluids.  Seen in consultation by general surgery.  Symptoms improving.  Started on clear liquid diet.  Symptoms of continue to improve.  Advanced to low fiber diet by time of discharge.  Continues to pass flatus.  Had normal bowel movement day of discharge.  Follow-up with general surgery within the next 4 weeks.    Consultations This Hospital Stay   SURGERY GENERAL IP CONSULT    Code Status   Full Code    Time Spent on this Encounter   I spent 25 minutes with Dr. Barker and working on discharge on 12/28/2023.       Durga Caruso DO  [facility]  [address] [facility] [address]  Phone: [phone]  Fax: [phone]  ______________________________________________________________________    Physical Exam   Vital Signs: Temp: 97.7  F (36.5  C) Temp src: Oral BP: 112/72 Pulse: 63   Resp: 16 SpO2: 96 % O2 Device: None (Room air)    Weight: 220 lbs " 0 oz  Gen:  NAD, A&Ox3.  Eyes:  PERRL, sclera anicteric.  OP:  MMM, no lesions.  Neck:  Supple.  CV:  Regular, no murmurs.  Lung:  CTA b/l, normal effort.  Ab:  +BS, soft.  Skin:  Warm, dry to touch.  No rash.  Ext:  No pitting edema LE b/l.         Primary Care Physician   Physician No Ref-Primary    Discharge Orders      Reason for your hospital stay    Small bowel obstruction.     Follow-up and recommended labs and tests     Follow-up with general surgery within 4 weeks.     Activity    Your activity upon discharge: activity as tolerated     Diet    Follow this diet upon discharge:       Low Fiber Diet.  Gradually advance to regular diet over the next several days.           Discharge Medications   Current Discharge Medication List        CONTINUE these medications which have NOT CHANGED    Details   ibuprofen (ADVIL/MOTRIN) 200 MG tablet Take 200 mg by mouth as needed for pain      omeprazole (PRILOSEC) 20 MG DR capsule Take 20 mg by mouth daily as needed           Allergies   No Known Allergies

## 2023-12-28 NOTE — PLAN OF CARE
"Afebrile. Denies pain. Mild RUQ abdominal tenderness. Passing flatus. 4-5 loose BMs per pt. Hypoactive bowel sounds. Tolerated full liquid diet with brief period of \"abdominal tightness.\" Voiding. Up ambulating hallways frequently. Plan: pain control, encourage fluids and ambulation. Will continue to monitor and provide for needs.   "

## 2023-12-28 NOTE — PLAN OF CARE
[place] regular diet.  Good bowel sounds.  Passing flatus.  Up ambulating on/off unit.  Meeting expected goals for discharge.  Reviewed discharge instructions with patient.  Patient verbalizes understanding of care at home. Discharged to home.

## 2023-12-28 NOTE — PROGRESS NOTES
"[facility]  General Surgery Progress Note           Assessment and Plan:   Assessment:   Small bowel obstruction; h/o congenital malrotation and underwent a Bakari's procedure as an infant   -Bowel function: +flatus and BMs      Plan:   -Diet: full liquids, OK to remain on this for 1-2 days, then increase to soft diet for 1-2 weeks; small meals.  -Dispo: OK to discharge home on diet restrictions.  Office appt in 4 weeks with Dr Schaefer.         Interval History:   Feeling quite improved.  No abd pain, but had a few cramps earlier this morning.  Passing flatus and multiple BMs.  Reviewed slow increase in diet, small meals.           Physical Exam:   Blood pressure 112/72, pulse 63, temperature 97.7  F (36.5  C), temperature source Oral, resp. rate 16, height 1.854 m (6' 1\"), weight 99.8 kg (220 lb), SpO2 96%.    I/O last 3 completed shifts:  In: 480 [P.O.:480]  Out: -     Constitutional:   awake, alert, cooperative, no apparent distress, and appears stated age     Abdomen:   Soft, non-tender, no distension, few popping bowel sounds.             Data:     Recent Labs   Lab 12/26/23  2317   WBC 12.1*   HGB 17.3   HCT 49.5   MCV 87        Recent Labs   Lab 12/26/23  2317      POTASSIUM 4.4   CHLORIDE 96*   CO2 27   ANIONGAP 13   *   BUN 14.5   CR 0.88   GFRESTIMATED >90   QUINTON 9.6   PROTTOTAL 7.8   ALBUMIN 4.6   BILITOTAL 0.7   ALKPHOS 66   AST 33   ALT 22     12/27 CT ABDOMEN PELVIS W CONTRAST   IMPRESSION:   Findings consistent with small bowel obstruction which may be on the basis of an internal hernia. There is evidence for underlying congenital malrotation of bowel. Mesenteric edema and small amount of ascites. No perforation or abscess.    Janet Leger PA-C      "

## 2024-01-25 ENCOUNTER — OFFICE VISIT (OUTPATIENT)
Dept: SURGERY | Facility: CLINIC | Age: 34
End: 2024-01-25
Payer: COMMERCIAL

## 2024-01-25 VITALS
HEART RATE: 68 BPM | HEIGHT: 73 IN | RESPIRATION RATE: 16 BRPM | WEIGHT: 220 LBS | BODY MASS INDEX: 29.16 KG/M2 | OXYGEN SATURATION: 97 % | DIASTOLIC BLOOD PRESSURE: 80 MMHG | SYSTOLIC BLOOD PRESSURE: 118 MMHG

## 2024-01-25 DIAGNOSIS — K56.609 SBO (SMALL BOWEL OBSTRUCTION) (H): Primary | ICD-10-CM

## 2024-01-25 PROCEDURE — 99212 OFFICE O/P EST SF 10 MIN: CPT | Performed by: SURGERY

## 2024-01-25 NOTE — PROGRESS NOTES
Patient returns to follow-up after his recent hospitalization for small bowel obstruction.  The patient had a malrotation and a [place]'s procedure when he was a baby.  Interestingly, his father was diagnosed with the same condition at the age of 50 after an abdominal injury.  The patient has been doing well since his hospitalization.  He has had a few times when he gets a bit of early satiety, but generally has been eating normally.    Past medical and surgical histories are reviewed.    The patient is in no apparent distress.  Breathing is nonlabored.  The abdomen is soft and nontender.  There is no bloating.    Assessment and plan: Overall, the patient is doing well.  At this point, I do not think any further imaging is probably indicated at this time.  If he does have recurrent symptoms, we may want to reconsider that in the future.  The patient may return to see me on an as-needed basis.    Damir Schaefer MD  [facility]    Please route or send letter to:  *None*

## 2024-06-28 ENCOUNTER — APPOINTMENT (OUTPATIENT)
Dept: GENERAL RADIOLOGY | Facility: CLINIC | Age: 34
DRG: 337 | End: 2024-06-28
Attending: STUDENT IN AN ORGANIZED HEALTH CARE EDUCATION/TRAINING PROGRAM
Payer: COMMERCIAL

## 2024-06-28 ENCOUNTER — APPOINTMENT (OUTPATIENT)
Dept: CT IMAGING | Facility: CLINIC | Age: 34
DRG: 337 | End: 2024-06-28
Attending: EMERGENCY MEDICINE
Payer: COMMERCIAL

## 2024-06-28 ENCOUNTER — APPOINTMENT (OUTPATIENT)
Dept: GENERAL RADIOLOGY | Facility: CLINIC | Age: 34
DRG: 337 | End: 2024-06-28
Attending: INTERNAL MEDICINE
Payer: COMMERCIAL

## 2024-06-28 ENCOUNTER — HOSPITAL ENCOUNTER (INPATIENT)
Facility: CLINIC | Age: 34
LOS: 3 days | Discharge: HOME | DRG: 337 | End: 2024-07-01
Attending: EMERGENCY MEDICINE | Admitting: STUDENT IN AN ORGANIZED HEALTH CARE EDUCATION/TRAINING PROGRAM
Payer: COMMERCIAL

## 2024-06-28 DIAGNOSIS — G89.18 ACUTE POST-OPERATIVE PAIN: Primary | ICD-10-CM

## 2024-06-28 DIAGNOSIS — K56.609 SBO (SMALL BOWEL OBSTRUCTION) (H): ICD-10-CM

## 2024-06-28 DIAGNOSIS — D72.829 LEUKOCYTOSIS, UNSPECIFIED TYPE: ICD-10-CM

## 2024-06-28 LAB
ALBUMIN SERPL BCG-MCNC: 4.7 G/DL (ref 3.5–5.2)
ALP SERPL-CCNC: 58 U/L (ref 40–150)
ALT SERPL W P-5'-P-CCNC: 51 U/L (ref 0–70)
ANION GAP SERPL CALCULATED.3IONS-SCNC: 11 MMOL/L (ref 7–15)
ANION GAP SERPL CALCULATED.3IONS-SCNC: 12 MMOL/L (ref 7–15)
AST SERPL W P-5'-P-CCNC: 45 U/L (ref 0–45)
BASOPHILS # BLD AUTO: 0 10E3/UL (ref 0–0.2)
BASOPHILS NFR BLD AUTO: 0 %
BILIRUB SERPL-MCNC: 0.4 MG/DL
BUN SERPL-MCNC: 18.5 MG/DL (ref 6–20)
BUN SERPL-MCNC: 20.8 MG/DL (ref 6–20)
CALCIUM SERPL-MCNC: 8.9 MG/DL (ref 8.6–10)
CALCIUM SERPL-MCNC: 9.7 MG/DL (ref 8.6–10)
CHLORIDE SERPL-SCNC: 102 MMOL/L (ref 98–107)
CHLORIDE SERPL-SCNC: 106 MMOL/L (ref 98–107)
CREAT SERPL-MCNC: 0.74 MG/DL (ref 0.67–1.17)
CREAT SERPL-MCNC: 0.99 MG/DL (ref 0.67–1.17)
DEPRECATED HCO3 PLAS-SCNC: 21 MMOL/L (ref 22–29)
DEPRECATED HCO3 PLAS-SCNC: 24 MMOL/L (ref 22–29)
EGFRCR SERPLBLD CKD-EPI 2021: >90 ML/MIN/1.73M2
EGFRCR SERPLBLD CKD-EPI 2021: >90 ML/MIN/1.73M2
EOSINOPHIL # BLD AUTO: 0.2 10E3/UL (ref 0–0.7)
EOSINOPHIL NFR BLD AUTO: 1 %
ERYTHROCYTE [DISTWIDTH] IN BLOOD BY AUTOMATED COUNT: 11.9 % (ref 10–15)
ERYTHROCYTE [DISTWIDTH] IN BLOOD BY AUTOMATED COUNT: 11.9 % (ref 10–15)
GLUCOSE SERPL-MCNC: 101 MG/DL (ref 70–99)
GLUCOSE SERPL-MCNC: 120 MG/DL (ref 70–99)
HCT VFR BLD AUTO: 43.7 % (ref 40–53)
HCT VFR BLD AUTO: 44.9 % (ref 40–53)
HGB BLD-MCNC: 15.2 G/DL (ref 13.3–17.7)
HGB BLD-MCNC: 15.8 G/DL (ref 13.3–17.7)
HOLD SPECIMEN: NORMAL
IMM GRANULOCYTES # BLD: 0.1 10E3/UL
IMM GRANULOCYTES NFR BLD: 0 %
LIPASE SERPL-CCNC: 20 U/L (ref 13–60)
LYMPHOCYTES # BLD AUTO: 3.6 10E3/UL (ref 0.8–5.3)
LYMPHOCYTES NFR BLD AUTO: 26 %
MCH RBC QN AUTO: 30.9 PG (ref 26.5–33)
MCH RBC QN AUTO: 31 PG (ref 26.5–33)
MCHC RBC AUTO-ENTMCNC: 34.8 G/DL (ref 31.5–36.5)
MCHC RBC AUTO-ENTMCNC: 35.2 G/DL (ref 31.5–36.5)
MCV RBC AUTO: 88 FL (ref 78–100)
MCV RBC AUTO: 89 FL (ref 78–100)
MONOCYTES # BLD AUTO: 0.9 10E3/UL (ref 0–1.3)
MONOCYTES NFR BLD AUTO: 7 %
NEUTROPHILS # BLD AUTO: 8.9 10E3/UL (ref 1.6–8.3)
NEUTROPHILS NFR BLD AUTO: 66 %
NRBC # BLD AUTO: 0 10E3/UL
NRBC BLD AUTO-RTO: 0 /100
PLATELET # BLD AUTO: 209 10E3/UL (ref 150–450)
PLATELET # BLD AUTO: 214 10E3/UL (ref 150–450)
POTASSIUM SERPL-SCNC: 3.8 MMOL/L (ref 3.4–5.3)
POTASSIUM SERPL-SCNC: 4.4 MMOL/L (ref 3.4–5.3)
PROT SERPL-MCNC: 7.7 G/DL (ref 6.4–8.3)
RBC # BLD AUTO: 4.92 10E6/UL (ref 4.4–5.9)
RBC # BLD AUTO: 5.1 10E6/UL (ref 4.4–5.9)
SODIUM SERPL-SCNC: 138 MMOL/L (ref 135–145)
SODIUM SERPL-SCNC: 138 MMOL/L (ref 135–145)
WBC # BLD AUTO: 13.6 10E3/UL (ref 4–11)
WBC # BLD AUTO: 7.6 10E3/UL (ref 4–11)

## 2024-06-28 PROCEDURE — 96361 HYDRATE IV INFUSION ADD-ON: CPT

## 2024-06-28 PROCEDURE — 36415 COLL VENOUS BLD VENIPUNCTURE: CPT | Performed by: EMERGENCY MEDICINE

## 2024-06-28 PROCEDURE — 99223 1ST HOSP IP/OBS HIGH 75: CPT | Performed by: STUDENT IN AN ORGANIZED HEALTH CARE EDUCATION/TRAINING PROGRAM

## 2024-06-28 PROCEDURE — 99207 PR APP CREDIT; MD BILLING SHARED VISIT: CPT | Performed by: INTERNAL MEDICINE

## 2024-06-28 PROCEDURE — 999N000065 XR ABDOMEN PORT 1 VIEW

## 2024-06-28 PROCEDURE — 74177 CT ABD & PELVIS W/CONTRAST: CPT

## 2024-06-28 PROCEDURE — 99418 PROLNG IP/OBS E/M EA 15 MIN: CPT | Performed by: STUDENT IN AN ORGANIZED HEALTH CARE EDUCATION/TRAINING PROGRAM

## 2024-06-28 PROCEDURE — 258N000003 HC RX IP 258 OP 636: Performed by: EMERGENCY MEDICINE

## 2024-06-28 PROCEDURE — 83690 ASSAY OF LIPASE: CPT | Performed by: EMERGENCY MEDICINE

## 2024-06-28 PROCEDURE — 250N000011 HC RX IP 250 OP 636: Performed by: EMERGENCY MEDICINE

## 2024-06-28 PROCEDURE — 74018 RADEX ABDOMEN 1 VIEW: CPT

## 2024-06-28 PROCEDURE — 96375 TX/PRO/DX INJ NEW DRUG ADDON: CPT

## 2024-06-28 PROCEDURE — 85025 COMPLETE CBC W/AUTO DIFF WBC: CPT | Performed by: EMERGENCY MEDICINE

## 2024-06-28 PROCEDURE — 82310 ASSAY OF CALCIUM: CPT | Performed by: STUDENT IN AN ORGANIZED HEALTH CARE EDUCATION/TRAINING PROGRAM

## 2024-06-28 PROCEDURE — 99285 EMERGENCY DEPT VISIT HI MDM: CPT | Mod: 25

## 2024-06-28 PROCEDURE — 85027 COMPLETE CBC AUTOMATED: CPT | Performed by: STUDENT IN AN ORGANIZED HEALTH CARE EDUCATION/TRAINING PROGRAM

## 2024-06-28 PROCEDURE — 120N000001 HC R&B MED SURG/OB

## 2024-06-28 PROCEDURE — 99222 1ST HOSP IP/OBS MODERATE 55: CPT | Mod: 57 | Performed by: STUDENT IN AN ORGANIZED HEALTH CARE EDUCATION/TRAINING PROGRAM

## 2024-06-28 PROCEDURE — 258N000003 HC RX IP 258 OP 636: Performed by: STUDENT IN AN ORGANIZED HEALTH CARE EDUCATION/TRAINING PROGRAM

## 2024-06-28 PROCEDURE — 96374 THER/PROPH/DIAG INJ IV PUSH: CPT | Mod: 59

## 2024-06-28 PROCEDURE — C9113 INJ PANTOPRAZOLE SODIUM, VIA: HCPCS | Performed by: INTERNAL MEDICINE

## 2024-06-28 PROCEDURE — 250N000009 HC RX 250: Performed by: EMERGENCY MEDICINE

## 2024-06-28 PROCEDURE — 80053 COMPREHEN METABOLIC PANEL: CPT | Performed by: EMERGENCY MEDICINE

## 2024-06-28 PROCEDURE — 250N000011 HC RX IP 250 OP 636: Performed by: STUDENT IN AN ORGANIZED HEALTH CARE EDUCATION/TRAINING PROGRAM

## 2024-06-28 PROCEDURE — 250N000013 HC RX MED GY IP 250 OP 250 PS 637: Performed by: INTERNAL MEDICINE

## 2024-06-28 PROCEDURE — 250N000013 HC RX MED GY IP 250 OP 250 PS 637: Performed by: STUDENT IN AN ORGANIZED HEALTH CARE EDUCATION/TRAINING PROGRAM

## 2024-06-28 PROCEDURE — 250N000011 HC RX IP 250 OP 636: Performed by: INTERNAL MEDICINE

## 2024-06-28 PROCEDURE — 36415 COLL VENOUS BLD VENIPUNCTURE: CPT | Performed by: STUDENT IN AN ORGANIZED HEALTH CARE EDUCATION/TRAINING PROGRAM

## 2024-06-28 RX ORDER — HYDROMORPHONE HYDROCHLORIDE 1 MG/ML
0.5 INJECTION, SOLUTION INTRAMUSCULAR; INTRAVENOUS; SUBCUTANEOUS
Status: DISCONTINUED | OUTPATIENT
Start: 2024-06-28 | End: 2024-06-29

## 2024-06-28 RX ORDER — KETOROLAC TROMETHAMINE 15 MG/ML
15 INJECTION, SOLUTION INTRAMUSCULAR; INTRAVENOUS ONCE
Status: COMPLETED | OUTPATIENT
Start: 2024-06-28 | End: 2024-06-28

## 2024-06-28 RX ORDER — ACETAMINOPHEN 325 MG/1
975 TABLET ORAL EVERY 8 HOURS PRN
Status: DISCONTINUED | OUTPATIENT
Start: 2024-06-28 | End: 2024-07-01 | Stop reason: HOSPADM

## 2024-06-28 RX ORDER — LIDOCAINE 4 G/G
1 PATCH TOPICAL
Status: DISCONTINUED | OUTPATIENT
Start: 2024-06-28 | End: 2024-07-01 | Stop reason: HOSPADM

## 2024-06-28 RX ORDER — KETOROLAC TROMETHAMINE 15 MG/ML
15 INJECTION, SOLUTION INTRAMUSCULAR; INTRAVENOUS EVERY 12 HOURS PRN
Status: DISCONTINUED | OUTPATIENT
Start: 2024-06-28 | End: 2024-06-28

## 2024-06-28 RX ORDER — ONDANSETRON 4 MG/1
4 TABLET, ORALLY DISINTEGRATING ORAL EVERY 6 HOURS PRN
Status: DISCONTINUED | OUTPATIENT
Start: 2024-06-28 | End: 2024-07-01 | Stop reason: HOSPADM

## 2024-06-28 RX ORDER — KETOROLAC TROMETHAMINE 15 MG/ML
15 INJECTION, SOLUTION INTRAMUSCULAR; INTRAVENOUS EVERY 6 HOURS PRN
Status: DISCONTINUED | OUTPATIENT
Start: 2024-06-28 | End: 2024-06-29

## 2024-06-28 RX ORDER — ONDANSETRON 2 MG/ML
4 INJECTION INTRAMUSCULAR; INTRAVENOUS EVERY 6 HOURS PRN
Status: DISCONTINUED | OUTPATIENT
Start: 2024-06-28 | End: 2024-07-01 | Stop reason: HOSPADM

## 2024-06-28 RX ORDER — HYDROMORPHONE HYDROCHLORIDE 1 MG/ML
0.3 INJECTION, SOLUTION INTRAMUSCULAR; INTRAVENOUS; SUBCUTANEOUS
Status: DISCONTINUED | OUTPATIENT
Start: 2024-06-28 | End: 2024-06-28

## 2024-06-28 RX ORDER — ONDANSETRON 2 MG/ML
4 INJECTION INTRAMUSCULAR; INTRAVENOUS ONCE
Status: COMPLETED | OUTPATIENT
Start: 2024-06-28 | End: 2024-06-28

## 2024-06-28 RX ORDER — NALOXONE HYDROCHLORIDE 0.4 MG/ML
0.2 INJECTION, SOLUTION INTRAMUSCULAR; INTRAVENOUS; SUBCUTANEOUS
Status: DISCONTINUED | OUTPATIENT
Start: 2024-06-28 | End: 2024-07-01 | Stop reason: HOSPADM

## 2024-06-28 RX ORDER — NALOXONE HYDROCHLORIDE 0.4 MG/ML
0.4 INJECTION, SOLUTION INTRAMUSCULAR; INTRAVENOUS; SUBCUTANEOUS
Status: DISCONTINUED | OUTPATIENT
Start: 2024-06-28 | End: 2024-07-01 | Stop reason: HOSPADM

## 2024-06-28 RX ORDER — AMOXICILLIN 250 MG
1 CAPSULE ORAL 2 TIMES DAILY PRN
Status: DISCONTINUED | OUTPATIENT
Start: 2024-06-28 | End: 2024-06-29

## 2024-06-28 RX ORDER — HYDROMORPHONE HYDROCHLORIDE 1 MG/ML
0.5 INJECTION, SOLUTION INTRAMUSCULAR; INTRAVENOUS; SUBCUTANEOUS
Status: DISCONTINUED | OUTPATIENT
Start: 2024-06-28 | End: 2024-06-28

## 2024-06-28 RX ORDER — SODIUM CHLORIDE, SODIUM LACTATE, POTASSIUM CHLORIDE, CALCIUM CHLORIDE 600; 310; 30; 20 MG/100ML; MG/100ML; MG/100ML; MG/100ML
INJECTION, SOLUTION INTRAVENOUS CONTINUOUS
Status: DISCONTINUED | OUTPATIENT
Start: 2024-06-28 | End: 2024-06-29

## 2024-06-28 RX ORDER — AMOXICILLIN 250 MG
2 CAPSULE ORAL 2 TIMES DAILY PRN
Status: DISCONTINUED | OUTPATIENT
Start: 2024-06-28 | End: 2024-06-29

## 2024-06-28 RX ORDER — IOPAMIDOL 755 MG/ML
500 INJECTION, SOLUTION INTRAVASCULAR ONCE
Status: COMPLETED | OUTPATIENT
Start: 2024-06-28 | End: 2024-06-28

## 2024-06-28 RX ADMIN — Medication 1 LOZENGE: at 14:35

## 2024-06-28 RX ADMIN — SODIUM CHLORIDE 1000 ML: 9 INJECTION, SOLUTION INTRAVENOUS at 01:32

## 2024-06-28 RX ADMIN — SODIUM CHLORIDE 65 ML: 9 INJECTION, SOLUTION INTRAVENOUS at 02:05

## 2024-06-28 RX ADMIN — HYDROMORPHONE HYDROCHLORIDE 0.3 MG: 1 INJECTION, SOLUTION INTRAMUSCULAR; INTRAVENOUS; SUBCUTANEOUS at 14:11

## 2024-06-28 RX ADMIN — PANTOPRAZOLE SODIUM 40 MG: 40 INJECTION, POWDER, FOR SOLUTION INTRAVENOUS at 17:23

## 2024-06-28 RX ADMIN — KETOROLAC TROMETHAMINE 15 MG: 15 INJECTION, SOLUTION INTRAMUSCULAR; INTRAVENOUS at 23:50

## 2024-06-28 RX ADMIN — PROCHLORPERAZINE EDISYLATE 5 MG: 5 INJECTION INTRAMUSCULAR; INTRAVENOUS at 17:23

## 2024-06-28 RX ADMIN — HYDROMORPHONE HYDROCHLORIDE 0.5 MG: 1 INJECTION, SOLUTION INTRAMUSCULAR; INTRAVENOUS; SUBCUTANEOUS at 20:58

## 2024-06-28 RX ADMIN — ONDANSETRON 4 MG: 2 INJECTION INTRAMUSCULAR; INTRAVENOUS at 01:32

## 2024-06-28 RX ADMIN — SODIUM CHLORIDE, POTASSIUM CHLORIDE, SODIUM LACTATE AND CALCIUM CHLORIDE: 600; 310; 30; 20 INJECTION, SOLUTION INTRAVENOUS at 21:00

## 2024-06-28 RX ADMIN — KETOROLAC TROMETHAMINE 15 MG: 15 INJECTION, SOLUTION INTRAMUSCULAR; INTRAVENOUS at 17:23

## 2024-06-28 RX ADMIN — SODIUM CHLORIDE, POTASSIUM CHLORIDE, SODIUM LACTATE AND CALCIUM CHLORIDE: 600; 310; 30; 20 INJECTION, SOLUTION INTRAVENOUS at 05:10

## 2024-06-28 RX ADMIN — DIATRIZOATE MEGLUMINE AND DIATRIZOATE SODIUM 120 ML: 660; 100 SOLUTION ORAL; RECTAL at 15:09

## 2024-06-28 RX ADMIN — ONDANSETRON 4 MG: 2 INJECTION INTRAMUSCULAR; INTRAVENOUS at 21:02

## 2024-06-28 RX ADMIN — KETOROLAC TROMETHAMINE 15 MG: 15 INJECTION, SOLUTION INTRAMUSCULAR; INTRAVENOUS at 09:23

## 2024-06-28 RX ADMIN — HYDROMORPHONE HYDROCHLORIDE 0.3 MG: 1 INJECTION, SOLUTION INTRAMUSCULAR; INTRAVENOUS; SUBCUTANEOUS at 06:42

## 2024-06-28 RX ADMIN — LIDOCAINE 1 PATCH: 4 PATCH TOPICAL at 06:36

## 2024-06-28 RX ADMIN — KETOROLAC TROMETHAMINE 15 MG: 15 INJECTION, SOLUTION INTRAMUSCULAR; INTRAVENOUS at 01:32

## 2024-06-28 RX ADMIN — ONDANSETRON 4 MG: 2 INJECTION INTRAMUSCULAR; INTRAVENOUS at 11:37

## 2024-06-28 RX ADMIN — IOPAMIDOL 100 ML: 755 INJECTION, SOLUTION INTRAVENOUS at 02:05

## 2024-06-28 ASSESSMENT — ACTIVITIES OF DAILY LIVING (ADL)
ADLS_ACUITY_SCORE: 20
ADLS_ACUITY_SCORE: 20
ADLS_ACUITY_SCORE: 35
ADLS_ACUITY_SCORE: 20
ADLS_ACUITY_SCORE: 35
ADLS_ACUITY_SCORE: 20
ADLS_ACUITY_SCORE: 33
ADLS_ACUITY_SCORE: 35
ADLS_ACUITY_SCORE: 20
ADLS_ACUITY_SCORE: 35
ADLS_ACUITY_SCORE: 20
ADLS_ACUITY_SCORE: 20
ADLS_ACUITY_SCORE: 35
ADLS_ACUITY_SCORE: 20
ADLS_ACUITY_SCORE: 20
ADLS_ACUITY_SCORE: 35
ADLS_ACUITY_SCORE: 20
ADLS_ACUITY_SCORE: 35
ADLS_ACUITY_SCORE: 35
ADLS_ACUITY_SCORE: 20
ADLS_ACUITY_SCORE: 35
ADLS_ACUITY_SCORE: 20
ADLS_ACUITY_SCORE: 20

## 2024-06-28 ASSESSMENT — COLUMBIA-SUICIDE SEVERITY RATING SCALE - C-SSRS
6. HAVE YOU EVER DONE ANYTHING, STARTED TO DO ANYTHING, OR PREPARED TO DO ANYTHING TO END YOUR LIFE?: NO
2. HAVE YOU ACTUALLY HAD ANY THOUGHTS OF KILLING YOURSELF IN THE PAST MONTH?: NO
1. IN THE PAST MONTH, HAVE YOU WISHED YOU WERE DEAD OR WISHED YOU COULD GO TO SLEEP AND NOT WAKE UP?: NO

## 2024-06-28 NOTE — PROGRESS NOTES
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  Per patient this was his first diagnosed small bowel obstruction.  He may have had prior undiagnosed small bowel obstructions in the past which resolved on its own.  He presented to the ER with complaint of abdominal pain, nausea and vomiting.  His pain is localized mostly to the left side of abdomen.  He has had 10 episodes of vomiting.  After receiving Toradol he felt much better.  We discussed NG tube placement.  He currently feels fine however if his pain comes back or he has another episode of nausea/vomiting will place NG tube.     -Admit to observation  - IV fluids  - Antiemetics as needed  - Pain management with Tylenol, lidocaine patch, IV Toradol, and IV Dilaudid  General surgery consultation requested.  NG tube replaced.  Patient to have Gastrografin study on 6/28/2024     Leukocytosis   WBC at 12.8.  Likely stress demargination in the setting of SBO.  Low suspicion for infection.  WBC count improved to 7.6 on 6/28/2024           Diet: NPO for Medical/Clinical Reasons Except for: Ice Chips, Meds    DVT Prophylaxis: Low Risk/Ambulatory with no VTE prophylaxis indicated  Muniz Catheter: Not present  Lines: None     Cardiac Monitoring: None  Code  Status: Full Code              Clinically Significant Risk Factors Present on Admission                                         Disposition Plan     Medically Ready for Discharge: Anticipated in 2-4 Days after bowel obstruction resolves and is able to tolerate diet             Floridalma Morales MD  Hospitalist Service  [facility]  Securely message with [employer] (more info)  Text page via [employer] Paging/Directory   ______________________________________________________________________    Interval History   Chart reviewed  Patient is resting comfortably in bed.  Intermittent abdominal pain.  No nausea.  Seen by general surgery team.  Planning on Gastrografin study and NG tube placement.  No other acute issues    Physical Exam   Vital Signs: Temp: 97.2  F (36.2  C) Temp src: Temporal BP: 114/59 Pulse: 75   Resp: 16 SpO2: 97 % O2 Device: None (Room air)    Weight: 226 lbs 10.13 oz    General Appearance: Alert awake, not in acute distress  Respiratory: Clear to auscultation bilaterally  Cardiovascular: Normal rate rhythm regular  GI: Soft, nondistended, tender in the mid left side of the abdomen, diminished bowel sounds, no guarding rigidity or rebound  Skin: Warm and dry  Other: No lower extremity edema    Medical Decision Making       45 MINUTES SPENT BY ME on the date of service doing chart review, history, exam, documentation & further activities per the note.      Data     I have personally reviewed the following data over the past 24 hrs:    7.6  \   15.2   / 209     138 106 18.5 /  101 (H)   4.4 21 (L) 0.74 \     ALT: 51 AST: 45 AP: 58 TBILI: 0.4   ALB: 4.7 TOT PROTEIN: 7.7 LIPASE: 20       Imaging results reviewed over the past 24 hrs:   Recent Results (from the past 24 hour(s))   Abd/pelvis CT,  IV  contrast only TRAUMA / AAA    Narrative    EXAM: CT ABDOMEN PELVIS W CONTRAST  LOCATION: [facility]  DATE: 6/28/2024    INDICATION: abdominal pain, history sbo  COMPARISON:  None.  TECHNIQUE: CT scan of the abdomen and pelvis was performed following injection of IV contrast. Multiplanar reformats were obtained. Dose reduction techniques were used.  CONTRAST: 100mL Isovue 370    FINDINGS:   LOWER CHEST: Unremarkable.    HEPATOBILIARY: Unremarkable.    PANCREAS: Unremarkable.    SPLEEN: Unremarkable.    ADRENAL GLANDS: Unremarkable.    KIDNEYS/BLADDER: No hydronephrosis. Decompressed bladder.      BOWEL: Small bowel obstruction with associated upstream fecalization, and transition point in the  left mid abdomen, image 98 series 3. Distal small bowel is decompressed. Acute angulation of adjacent nondilated small bowel loops noted in the left   midabdomen (image 122) compatible with additional adhesions.    LYMPH NODES: No significant retroperitoneal adenopathy    VASCULATURE: No abdominal aortic aneurysm. Patent portal vein.    PELVIC ORGANS: Mild prostatic hypertrophy. No free fluid.    MUSCULOSKELETAL: No acute bony abnormalities.      Impression    IMPRESSION:   1.  High-grade proximal small bowel obstruction.  2.   No free fluid or free air.

## 2024-06-28 NOTE — PLAN OF CARE
"Goal Outcome Evaluation:      Plan of Care Reviewed With: patient, spouse    Overall Patient Progress: no changeOverall Patient Progress: no change    Outcome Evaluation: Pt arrive to the floor, pain controlled with IV torodol and IV dilaudid, nausea - placed a NG today. No output yet. on low intermitted suction, Started gastrografin at 1500, post xray at 11:25pm. NPO except ice.Lozenges and throat spray if needed prn. Up independent in room.      Problem: Adult Inpatient Plan of Care  Goal: Plan of Care Review  Description: The Plan of Care Review/Shift note should be completed every shift.  The Outcome Evaluation is a brief statement about your assessment that the patient is improving, declining, or no change.  This information will be displayed automatically on your shift  note.  Outcome: Progressing  Flowsheets (Taken 6/28/2024 1530)  Outcome Evaluation: Pt arrive to the floor, pain controlled with IV torodol and IV dilaudid, nausea - placed a NG today. No output yet. on low intermitted suction, Started gastrografin at 1500, post xray at 11:25pm. NPO except ice.Lozenges and throat spray if needed prn. Up independent in room.  Plan of Care Reviewed With:   patient   spouse  Overall Patient Progress: no change  Goal: Patient-Specific Goal (Individualized)  Description: You can add care plan individualizations to a care plan. Examples of Individualization might be:  \"Parent requests to be called daily at 9am for status\", \"I have a hard time hearing out of my right ear\", or \"Do not touch me to wake me up as it startles  me\".  Outcome: Progressing  Goal: Absence of Hospital-Acquired Illness or Injury  Outcome: Progressing  Intervention: Identify and Manage Fall Risk  Recent Flowsheet Documentation  Taken 6/28/2024 1000 by Sury Oakley, RN  Safety Promotion/Fall Prevention:   nonskid shoes/slippers when out of bed   increase visualization of patient  Intervention: Prevent Skin Injury  Recent Flowsheet " Documentation  Taken 6/28/2024 1000 by Sury Oakley RN  Device Skin Pressure Protection: tubing/devices free from skin contact  Taken 6/28/2024 0945 by Sury Oakley RN  Body Position: position changed independently  Intervention: Prevent and Manage VTE (Venous Thromboembolism) Risk  Recent Flowsheet Documentation  Taken 6/28/2024 1000 by Sury Oakely RN  VTE Prevention/Management: SCDs on (sequential compression devices)  Intervention: Prevent Infection  Recent Flowsheet Documentation  Taken 6/28/2024 1000 by Sury Oakley RN  Infection Prevention: rest/sleep promoted  Goal: Optimal Comfort and Wellbeing  Outcome: Progressing  Goal: Readiness for Transition of Care  Outcome: Progressing  Intervention: Mutually Develop Transition Plan  Recent Flowsheet Documentation  Taken 6/28/2024 1000 by Sury Oakley RN  Patient/Family Anticipates Transition to: home  Equipment Currently Used at Home: none     Problem: Pain Acute  Goal: Optimal Pain Control and Function  Outcome: Progressing  Intervention: Prevent or Manage Pain  Recent Flowsheet Documentation  Taken 6/28/2024 1000 by Sury Oakley RN  Sensory Stimulation Regulation:   lighting decreased   quiet environment promoted  Medication Review/Management: medications reviewed  Intervention: Optimize Psychosocial Wellbeing  Recent Flowsheet Documentation  Taken 6/28/2024 1000 by Sury Oakley RN  Supportive Measures:   active listening utilized   relaxation techniques promoted

## 2024-06-28 NOTE — PHARMACY-ADMISSION MEDICATION HISTORY
Pharmacist Admission Medication History    Admission medication history is complete. The information provided in this note is only as accurate as the sources available at the time of the update.    Information Source(s): Patient and CareEverywhere/SureScripts via in-person    Pertinent Information:      Changes made to PTA medication list:  Added: None  Deleted: None  Changed: None    Allergies reviewed with patient and updates made in EHR: yes    Medication History Completed By: Amelie Rosas PharmD 6/28/2024 9:24 AM    PTA Med List   Medication Sig Last Dose    ibuprofen (ADVIL/MOTRIN) 200 MG tablet Take 200 mg by mouth as needed for pain prn    omeprazole (PRILOSEC) 20 MG DR capsule Take 20 mg by mouth daily as needed prn

## 2024-06-28 NOTE — H&P
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  Per patient this was his first diagnosed small bowel obstruction.  He may have had prior undiagnosed small bowel obstructions in the past which resolved on its own.  He presented to the ER with complaint of abdominal pain, nausea and vomiting.  His pain is localized mostly to the left side of abdomen.  He has had 10 episodes of vomiting.  After receiving Toradol he felt much better.  We discussed NG tube placement.  He currently feels fine however if his pain comes back or he has another episode of nausea/vomiting will place NG tube.    -Admit to observation  - IV fluids  - Antiemetics as needed  - Pain management with Tylenol, lidocaine patch, IV Toradol, and IV Dilaudid  - General surgery consultation  - N.p.o.    Leukocytosis   WBC at 12.8.  Likely stress demargination in the setting of SBO.  Low suspicion for infection.  - Continue to monitor CBC          Diet: NPO for Medical/Clinical Reasons Except for: Ice Chips, Meds    DVT Prophylaxis: Low Risk/Ambulatory with no VTE prophylaxis indicated  Muniz Catheter: Not present  Lines: None     Cardiac Monitoring: None  Code Status: Full Code      Clinically Significant Risk Factors Present on Admission                                          Disposition Plan     Medically Ready for Discharge: Anticipated in 2-4 Days           Fadumo Garzon MD  Hospitalist Service  [facility]  Securely message with [employer] (more info)  Text page via [employer] Paging/Directory     ______________________________________________________________________    Chief Complaint   Abdominal pain    History is obtained from the patient    History of Present Illness     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     Patient said that he was fine until last evening when he started developing abdominal pain soon after it was followed by nausea and vomiting.  So far he has had 10 episodes of vomiting.  He denied any fever or chills.  He has had similar presentation in December last year and at that time he was found to have a small bowel obstruction.  He was worried if he has another episode of small bowel obstruction so he decided to come to the ER.  He denied any fever or chills.  His pain is localized mostly to the left side of abdomen.  Receiving a dose of Toradol in the ED he felt much better      Presentation to the ER he was afebrile, pulse 63, blood pressure 116/68, saturating well on room air.  Labs showed mild leukocytosis at 13.6, BUN at 20.8, bicarb 24, chloride 102, sodium 138.  Blood glucose 120, lipase 20.  CT abdomen and pelvis showed high-grade proximal small bowel obstruction, no free fluid or free air.  He received IV fluid in the ED, Toradol, and Zofran.  He is being admitted to the observation for further management    Past Medical History    Past Medical History:   Diagnosis Date    Congenital  malrotation of intestine (H28)     repaired in the first week of life    Gastroesophageal reflux disease without esophagitis        Past Surgical History   Past Surgical History:   Procedure Laterality Date    ABDOMEN SURGERY      congenital malrotation of the bowel repaired as an infant       Prior to Admission Medications   Prior to Admission Medications   Prescriptions Last Dose Informant Patient Reported? Taking?   ibuprofen (ADVIL/MOTRIN) 200 MG tablet   Yes No   Sig: Take 200 mg by mouth as needed for pain   omeprazole (PRILOSEC) 20 MG DR capsule   Yes No   Sig: Take 20 mg by mouth daily as needed      Facility-Administered Medications: None        Review of Systems    The 10 point Review of Systems is negative other than noted in the HPI or here.      Physical Exam   Vital Signs: Temp: 96.9  F (36.1  C) Temp src: Temporal BP: 106/71 Pulse: 62   Resp: 18 SpO2: 98 % O2 Device: None (Room air)    Weight: 226 lbs 10.13 oz    Constitutional: awake, alert, cooperative, no apparent distress, and appears stated age  Eyes: anicteric sclera  ENT: normocephalic, without obvious abnormality  Respiratory: No increased work of breathing, good air exchange, clear to auscultation bilaterally, no crackles or wheezing  Cardiovascular: normal rate, low rhythm no murmur  GI: Soft, nondistended, mild tenderness over the left upper quadrant, no suprapubic or  right upper quadrant tenderness  Neurologic: Awake, alert, oriented to name, place and time. Moving all 4 extremities  Neuropsychiatric:.  Appropriate Mood and affect    Medical Decision Making       60 MINUTES SPENT BY ME on the date of service doing chart review, history, exam, documentation & further activities per the note.      Data   ------------------------- PAST 24 HR DATA REVIEWED -----------------------------------------------

## 2024-06-28 NOTE — CONSULTS
General Surgery Consultation    Raji Barker MRN# [id number]   Age: 34 year old YOB: 1990     Date of Admission:  6/28/2024    Reason for consult:            Small bowel obstruction       Requesting physician:            Dr Garzon                Assessment and Plan:   Assessment/Plan:   #small bowel obstruction   #congenital malrotation and history of LADDs     Mr Barker is admitted with a small bowel obstruction. There is a transition point in the left mid-abdomen. There is no signs of bowel ischemia or closed loop obstruction. He does have a history of a RACHEL procedure for malrotation as an infant. This is his second bowel obstruction in the last 6 months and the last one resolved conservatively.     His pain had improved but is returning this morning. We mutually agreed to continue conservative therapy for now with NGT and GGC. I did offer surgery today but did explain that while I would attempt laparoscopic surgery there is a reasonable high risk of conversion to open procedure. He would like to attempt non-operative management for now which is reasonable. We will follow closely.               Chief Complaint:   Abdominal pain, nausea, vomiting     History is obtained from the patient         History of Present Illness:   Raji Barker is a 34 year old male who presented to the ED with abdominal pain, nausea and vomiting yesterday. The pain came on yesterday afternoon and then worsened. It felt similar to his symptoms about 6 months ago. He also felt nauseated and vomited several times. His last emesis was in the ED. He had a small bowel movement yesterday evening. The pain improved significantly after pain medications in the hospital but is returning again this morning. He would like to try and NGT and avoid surgery for now.     He has a history of intestinal malrotation which was repaired as an infant via a LADDs procedure.            Past Medical History:     Past Medical History:   Diagnosis Date     Congenital malrotation of intestine (H28)     repaired in the first week of life    Gastroesophageal reflux disease without esophagitis              Past Surgical History:     Past Surgical History:   Procedure Laterality Date    ABDOMEN SURGERY      congenital malrotation of the bowel repaired as an infant             Social History:     Social History     Tobacco Use    Smoking status: Never    Smokeless tobacco: Never   Substance Use Topics    Alcohol use: Yes     Comment: occasionally             Family History:     Family History   Problem Relation Age of Onset    Intestinal malrotation Father             Allergies:   No Known Allergies          Medications:     Current Facility-Administered Medications   Medication Dose Route Frequency Provider Last Rate Last Admin    acetaminophen (TYLENOL) tablet 975 mg  975 mg Oral Q8H PRN Fadumo Gazron MD        diatrizoate meglumine-sodium (GASTROGRAFIN/GASTROVIEW) 66-10 % solution 120 mL  120 mL Oral Once Santos De La Rosa MD        HYDROmorphone (PF) (DILAUDID) injection 0.3 mg  0.3 mg Intravenous Q3H PRN Fadumo Garzon MD   0.3 mg at 06/28/24 0642    ketorolac (TORADOL) injection 15 mg  15 mg Intravenous Q12H PRN Fadumo Garzon MD   15 mg at 06/28/24 0923    lactated ringers infusion   Intravenous Continuous Fadumo Garzon  mL/hr at 06/28/24 0510 New Bag at 06/28/24 0510    Lidocaine (LIDOCARE) 4 % Patch 1 patch  1 patch Transdermal Q24H Fadumo Garzon MD   1 patch at 06/28/24 0636    naloxone (NARCAN) injection 0.2 mg  0.2 mg Intravenous Q2 Min PRN Fadumo Garzon MD        Or    naloxone (NARCAN) injection 0.4 mg  0.4 mg Intravenous Q2 Min PRN Fadumo Garzon MD        Or    naloxone (NARCAN) injection 0.2 mg  0.2 mg Intramuscular Q2 Min PRN Fadumo Garzon MD        Or    naloxone (NARCAN) injection 0.4 mg  0.4 mg Intramuscular Q2 Min PRN Fadumo Garzon MD        ondansetron (ZOFRAN ODT) ODT tab 4 mg  4 mg Oral Q6H PRN Fadumo Garzon MD        Or    ondansetron (ZOFRAN) injection 4 mg  4  mg Intravenous Q6H PRFadumo Brink MD        sendanish-docusate (SENOKOT-S/PERICOLACE) 8.6-50 MG per tablet 1 tablet  1 tablet Oral BID PRFadumo Brink MD        Or    senna-docusate (SENOKOT-S/PERICOLACE) 8.6-50 MG per tablet 2 tablet  2 tablet Oral BID PRFadumo Brink MD         Current Facility-Administered Medications   Medication Dose Route Frequency Provider Last Rate Last Admin    diatrizoate meglumine-sodium (GASTROGRAFIN/GASTROVIEW) 66-10 % solution 120 mL  120 mL Oral Once Santos De La Rosa MD        Lidocaine (LIDOCARE) 4 % Patch 1 patch  1 patch Transdermal Q24H Fadumo Garzon MD   1 patch at 06/28/24 0636            Review of Systems:   The 10 point review of systems is negative other than noted in the HPI.          Physical Exam:   /59 (BP Location: Right arm, Patient Position: Supine, Cuff Size: Adult Regular)   Pulse 75   Temp 97.2  F (36.2  C) (Temporal)   Resp 16   Wt 102.8 kg (226 lb 10.1 oz)   SpO2 97%   BMI 29.90 kg/m    General - Well developed, well nourished male in no apparent distress  Lungs: normal effort on RA   Heart: regular rate and rhythm  Abdomen: soft, no significantly distended, mild-moderate tenderness to palpation in the left mid-abdomen otherwise not significantly tender   MSK: Extremities warm without edema  Neurologic: nonfocal  Psychiatric: Mood and affect appropriate  Skin: Without lesions, rashes, or jaundice         Data:   Labs reviewed    Imaging:  All imaging studies reviewed by me and my interpretation of the CT is: prior LADDS procedure, transition point in left mid-abdomen       Santos De La Rosa MD  6/28/2024 9:43 AM

## 2024-06-28 NOTE — ED PROVIDER NOTES
Emergency Department Note      History of Present Illness     Chief Complaint   Abdominal Pain    HPI   Raji Barker is a 34 year old male with a history of congenital malrotation of intestine and GERD who presents to the emergency department for abdominal pain. The patient states that tonight at [id number], he began experiencing a sudden onset of diffuse and intermittent abdominal pain. He notes that his pain was 1/10 but has since become a 4-5/10. He reports that he has also experienced 8 episodes of vomiting. Denies hematemesis. He adds that he is also experiencing mild ongoing constipation. He states that he was diagnosed with a SBO on 12/27/23 and this feels similar. Denies fever or chills. Denies chest pain or shortness of breath. Denies diarrhea. Denies urinary symptoms. Denies ETOH or drug use. Denies eating suspicious foods. He notes that he has had recent sick contacts at home and at work. Works as an internal medicine hospitalist.     Independent Historian   None    Review of External Notes   12/27/24: SBO    Past Medical History     Medical History and Problem List   Congenital malrotation of intestine  GERD    Medications   Omeprazole    Surgical History   Abdomen surgery, intestines    Physical Exam     Patient Vitals for the past 24 hrs:   BP Temp Temp src Pulse Resp SpO2 Weight   06/28/24 0130 117/76 -- -- -- -- -- --   06/28/24 0051 116/68 96.9  F (36.1  C) Temporal 63 18 99 % 102.8 kg (226 lb 10.1 oz)     Physical Exam  Nursing note and vitals reviewed.  Constitutional: Well nourished.   Eyes: Conjunctiva normal.  Pupils are equal, round, and reactive to light.   ENT: Nose normal. Mucous membranes pink and moist.    Neck: Normal range of motion.  CVS: Normal rate, regular rhythm.  Normal heart sounds.    Pulmonary: Lungs clear to auscultation bilaterally. No wheezes/rales/rhonchi.  GI: Abdomen soft. Mild generalized tenderness. No rigidity or guarding.  No CVA tenderness  MSK: Moves all  extremities  Neuro: Alert. Follows simple commands.  Skin: Skin is warm and dry. No rash noted.   Psychiatric: Normal affect.     Diagnostics     Lab Results   Labs Ordered and Resulted from Time of ED Arrival to Time of ED Departure   COMPREHENSIVE METABOLIC PANEL - Abnormal       Result Value    Sodium 138      Potassium 3.8      Carbon Dioxide (CO2) 24      Anion Gap 12      Urea Nitrogen 20.8 (*)     Creatinine 0.99      GFR Estimate >90      Calcium 9.7      Chloride 102      Glucose 120 (*)     Alkaline Phosphatase 58      AST 45      ALT 51      Protein Total 7.7      Albumin 4.7      Bilirubin Total 0.4     CBC WITH PLATELETS AND DIFFERENTIAL - Abnormal    WBC Count 13.6 (*)     RBC Count 5.10      Hemoglobin 15.8      Hematocrit 44.9      MCV 88      MCH 31.0      MCHC 35.2      RDW 11.9      Platelet Count 214      % Neutrophils 66      % Lymphocytes 26      % Monocytes 7      % Eosinophils 1      % Basophils 0      % Immature Granulocytes 0      NRBCs per 100 WBC 0      Absolute Neutrophils 8.9 (*)     Absolute Lymphocytes 3.6      Absolute Monocytes 0.9      Absolute Eosinophils 0.2      Absolute Basophils 0.0      Absolute Immature Granulocytes 0.1      Absolute NRBCs 0.0     LIPASE - Normal    Lipase 20       Imaging   Abd/pelvis CT,  IV  contrast only TRAUMA / AAA   Final Result   IMPRESSION:    1.  High-grade proximal small bowel obstruction.   2.   No free fluid or free air.        Independent Interpretation   None    ED Course      Medications Administered   Medications   HYDROmorphone (PF) (DILAUDID) injection 0.5 mg (has no administration in time range)   sodium chloride 0.9% BOLUS 1,000 mL (1,000 mLs Intravenous $New Bag 6/28/24 0132)   ketorolac (TORADOL) injection 15 mg (15 mg Intravenous $Given 6/28/24 0132)   ondansetron (ZOFRAN) injection 4 mg (4 mg Intravenous $Given 6/28/24 0132)   CT scan flush (65 mLs Intravenous $Given 6/28/24 0205)   iopamidol (ISOVUE-370) solution 500 mL (100 mLs  Intravenous $Given 6/28/24 0205)     Discussion of Management   Admitting Hospitalist, Dr. Fadumo Garzon    Social Determinants of Health adding to complexity of care   None    ED Course   ED Course as of 06/28/24 0255   Fri Jun 28, 2024   0124 I obtained history and examined the patient as noted above.      0251 Patient reports feeling much improved.     0254 I spoke with Dr. Garzon, hospitalist, regarding the patient. She accepts patient for admission.       Medical Decision Making / Diagnosis     CMS Diagnoses: None    MIPS       None    MDM   Raji Barker is a 34 year old male with known history of SBO presenting with abdominal pain and vomiting.  He is nontoxic on arrival.  Labs with mild leukocytosis though stronger suspicion this is more reactive with vomiting.  No profound electrolyte derangements.  His pain and nausea was controlled during his time in the ED.  CT abdomen does suggest concerns for high-grade proximal small bowel obstruction though no free air.  He remained otherwise hemodynamically stable.  We have held off on NG tube at this point in time as no recurrent vomiting during patient's time in the ED.  Patient NPO. He is accepted by hospitalist for admission and general surgery consultation.    Disposition   The patient was admitted to the hospital.     Diagnosis     ICD-10-CM    1. SBO (small bowel obstruction) (H)  K56.609       2. Leukocytosis, unspecified type  D72.829          Scribe Disclosure:  Bakari WOOD, am serving as a scribe at 1:27 AM on 6/28/2024 to document services personally performed by Michelle Barnett DO based on my observations and the provider's statements to me.        Michelle Barnett DO  06/28/24 0256

## 2024-06-28 NOTE — ED TRIAGE NOTES
Patient states he thinks he has a SBO, has not had a bowel movement in 6 hours, nausea, vomiting and abdominal pain. Last time vomiting at midnight. States he had a SBO 6 months ago and these are the same symptoms. ABCs intact. VSS.

## 2024-06-28 NOTE — PLAN OF CARE
[facility]    ED Boarding Nurse Handoff Addendum Report:    Date/time: 6/28/2024, 6:57 AM    Activity Level: independent    Fall Risk: No    Active Infusions:  ml/hr    Current Meds Due: See mar    Current care needs: Pain management    Oxygen requirements (liters/min and/or FiO2): RA    Respiratory status: Room air    Vital signs (within last 30 minutes):    Vitals:    06/28/24 0302 06/28/24 0330 06/28/24 0400 06/28/24 0600   BP:  100/62 100/63 101/59   Pulse:  60 58 62   Resp:       Temp:       TempSrc:       SpO2: 97%   99%   Weight:           Focused assessment within last 30 minutes:    Pain management    ED Boarding Nurse name: Vanda Chand RN

## 2024-06-28 NOTE — PLAN OF CARE
"[id number] RN very painful upper abd pain. Did text Dr. Morales and new pain orders and nausea orders. Provided toradol and compazine. Pain 9/10. Heating pad applied. Good bowel sounds. No emesis. NG clamped for gastrografin oral challenge. Able to safely get to bathroom per self. No edema. Iv fluids.   Problem: Adult Inpatient Plan of Care  Goal: Plan of Care Review  Description: The Plan of Care Review/Shift note should be completed every shift.  The Outcome Evaluation is a brief statement about your assessment that the patient is improving, declining, or no change.  This information will be displayed automatically on your shift  note.  Outcome: Not Progressing  Flowsheets (Taken 6/28/2024 1733)  Plan of Care Reviewed With: patient  Overall Patient Progress: no change  Goal: Patient-Specific Goal (Individualized)  Description: You can add care plan individualizations to a care plan. Examples of Individualization might be:  \"Parent requests to be called daily at 9am for status\", \"I have a hard time hearing out of my right ear\", or \"Do not touch me to wake me up as it startles  me\".  Outcome: Not Progressing  Goal: Absence of Hospital-Acquired Illness or Injury  Outcome: Not Progressing  Intervention: Identify and Manage Fall Risk  Recent Flowsheet Documentation  Taken 6/28/2024 1604 by Rachel Field, RN  Safety Promotion/Fall Prevention:   nonskid shoes/slippers when out of bed   increase visualization of patient  Intervention: Prevent Skin Injury  Recent Flowsheet Documentation  Taken 6/28/2024 1604 by Rachel Field, RN  Device Skin Pressure Protection: tubing/devices free from skin contact  Intervention: Prevent and Manage VTE (Venous Thromboembolism) Risk  Recent Flowsheet Documentation  Taken 6/28/2024 1604 by Rachel Field, RN  VTE Prevention/Management: SCDs on (sequential compression devices)  Intervention: Prevent Infection  Recent Flowsheet Documentation  Taken 6/28/2024 1604 by " Rachel Field RN  Infection Prevention: rest/sleep promoted  Goal: Optimal Comfort and Wellbeing  Outcome: Not Progressing  Intervention: Monitor Pain and Promote Comfort  Recent Flowsheet Documentation  Taken 6/28/2024 1732 by Rachel Field RN  Pain Management Interventions: medication (see MAR)  Taken 6/28/2024 1545 by Rachel Field RN  Pain Management Interventions: rest  Goal: Readiness for Transition of Care  Outcome: Not Progressing     Problem: Pain Acute  Goal: Optimal Pain Control and Function  Outcome: Not Progressing  Intervention: Develop Pain Management Plan  Recent Flowsheet Documentation  Taken 6/28/2024 1732 by Rachel Field RN  Pain Management Interventions: medication (see MAR)  Taken 6/28/2024 1545 by Rachel Field RN  Pain Management Interventions: rest  Intervention: Prevent or Manage Pain  Recent Flowsheet Documentation  Taken 6/28/2024 1604 by Rachel Field RN  Sensory Stimulation Regulation:   lighting decreased   quiet environment promoted  Medication Review/Management: medications reviewed  Intervention: Optimize Psychosocial Wellbeing  Recent Flowsheet Documentation  Taken 6/28/2024 1604 by Rachel Field RN  Supportive Measures:   active listening utilized   relaxation techniques promoted     Problem: Intestinal Obstruction  Goal: Optimal Bowel Function  Outcome: Not Progressing  Intervention: Promote Bowel Function  Recent Flowsheet Documentation  Taken 6/28/2024 1604 by Rachel Field RN  Head of Bed (HOB) Positioning: HOB at 60 degrees  Goal: Fluid and Electrolyte Balance  Outcome: Not Progressing  Goal: Absence of Infection Signs and Symptoms  Outcome: Not Progressing  Goal: Optimize Nutrition Status  Outcome: Not Progressing  Goal: Optimal Pain Control and Function  Outcome: Not Progressing  Intervention: Prevent or Manage Pain  Recent Flowsheet Documentation  Taken 6/28/2024 1732 by Rachel Field,  RN  Pain Management Interventions: medication (see MAR)  Taken 6/28/2024 1545 by Rachel Field, RN  Pain Management Interventions: rest   Goal Outcome Evaluation:      Plan of Care Reviewed With: patient    Overall Patient Progress: no changeOverall Patient Progress: no change

## 2024-06-28 NOTE — ED NOTES
[facility]  ED Nurse Handoff Report    ED Chief complaint: Abdominal Pain  . ED Diagnosis:   Final diagnoses:   SBO (small bowel obstruction) (H)   Leukocytosis, unspecified type       Allergies: No Known Allergies    Code Status: Full Code    Activity level - Baseline/Home:  independent.  Activity Level - Current:   independent.   Lift room needed: No.   Bariatric: No   Needed: No   Isolation: No.   Infection: Not Applicable.     Respiratory status: Room air    Vital Signs (within 30 minutes):   Vitals:    06/28/24 0051 06/28/24 0130 06/28/24 0300   BP: 116/68 117/76 106/71   Pulse: 63  62   Resp: 18     Temp: 96.9  F (36.1  C)     TempSrc: Temporal     SpO2: 99%  98%   Weight: 102.8 kg (226 lb 10.1 oz)         Cardiac Rhythm:  ,      Pain level:    Patient confused: No.   Patient Falls Risk: nonskid shoes/slippers when out of bed.   Elimination Status: Has voided     Patient Report - Initial Complaint: Raji Barker is a 34 year old male with a history of congenital malrotation of intestine and GERD who presents to the emergency department for abdominal pain. The patient states that tonight at [id number], he began experiencing a sudden onset of diffuse and intermittent abdominal pain. He notes that his pain was 1/10 but has since become a 4-5/10. He reports that he has also experienced 8 episodes of vomiting. Denies hematemesis. He adds that he is also experiencing mild ongoing constipation. He states that he was diagnosed with a SBO on 12/27/23 and this feels similar. Denies fever or chills. Denies chest pain or shortness of breath. Denies diarrhea. Denies urinary symptoms. Denies ETOH or drug use. Denies eating suspicious foods. He notes that he has had recent sick contacts at home and at work. Works as an internal medicine hospitalist. .   Focused Assessment: GI work up      Abnormal Results:   Labs Ordered and Resulted from Time of ED Arrival to Time of ED Departure    COMPREHENSIVE METABOLIC PANEL - Abnormal       Result Value    Sodium 138      Potassium 3.8      Carbon Dioxide (CO2) 24      Anion Gap 12      Urea Nitrogen 20.8 (*)     Creatinine 0.99      GFR Estimate >90      Calcium 9.7      Chloride 102      Glucose 120 (*)     Alkaline Phosphatase 58      AST 45      ALT 51      Protein Total 7.7      Albumin 4.7      Bilirubin Total 0.4     CBC WITH PLATELETS AND DIFFERENTIAL - Abnormal    WBC Count 13.6 (*)     RBC Count 5.10      Hemoglobin 15.8      Hematocrit 44.9      MCV 88      MCH 31.0      MCHC 35.2      RDW 11.9      Platelet Count 214      % Neutrophils 66      % Lymphocytes 26      % Monocytes 7      % Eosinophils 1      % Basophils 0      % Immature Granulocytes 0      NRBCs per 100 WBC 0      Absolute Neutrophils 8.9 (*)     Absolute Lymphocytes 3.6      Absolute Monocytes 0.9      Absolute Eosinophils 0.2      Absolute Basophils 0.0      Absolute Immature Granulocytes 0.1      Absolute NRBCs 0.0     LIPASE - Normal    Lipase 20          Abd/pelvis CT,  IV  contrast only TRAUMA / AAA   Final Result   IMPRESSION:    1.  High-grade proximal small bowel obstruction.   2.   No free fluid or free air.          Treatments provided:   Abd/pelvis CT,  IV  contrast only TRAUMA / AAA   Final Result   IMPRESSION:    1.  High-grade proximal small bowel obstruction.   2.   No free fluid or free air.        Labs Ordered and Resulted from Time of ED Arrival to Time of ED Departure   COMPREHENSIVE METABOLIC PANEL - Abnormal       Result Value    Sodium 138      Potassium 3.8      Carbon Dioxide (CO2) 24      Anion Gap 12      Urea Nitrogen 20.8 (*)     Creatinine 0.99      GFR Estimate >90      Calcium 9.7      Chloride 102      Glucose 120 (*)     Alkaline Phosphatase 58      AST 45      ALT 51      Protein Total 7.7      Albumin 4.7      Bilirubin Total 0.4     CBC WITH PLATELETS AND DIFFERENTIAL - Abnormal    WBC Count 13.6 (*)     RBC Count 5.10      Hemoglobin 15.8       Hematocrit 44.9      MCV 88      MCH 31.0      MCHC 35.2      RDW 11.9      Platelet Count 214      % Neutrophils 66      % Lymphocytes 26      % Monocytes 7      % Eosinophils 1      % Basophils 0      % Immature Granulocytes 0      NRBCs per 100 WBC 0      Absolute Neutrophils 8.9 (*)     Absolute Lymphocytes 3.6      Absolute Monocytes 0.9      Absolute Eosinophils 0.2      Absolute Basophils 0.0      Absolute Immature Granulocytes 0.1      Absolute NRBCs 0.0     LIPASE - Normal    Lipase 20       Family Comments: lives with family  OBS brochure/video discussed/provided to patient:  No  ED Medications:   Medications   HYDROmorphone (PF) (DILAUDID) injection 0.5 mg (has no administration in time range)   sodium chloride 0.9% BOLUS 1,000 mL (0 mLs Intravenous Stopped 6/28/24 0315)   ketorolac (TORADOL) injection 15 mg (15 mg Intravenous $Given 6/28/24 0132)   ondansetron (ZOFRAN) injection 4 mg (4 mg Intravenous $Given 6/28/24 0132)   CT scan flush (65 mLs Intravenous $Given 6/28/24 0205)   iopamidol (ISOVUE-370) solution 500 mL (100 mLs Intravenous $Given 6/28/24 0205)       Drips infusing:  No  For the majority of the shift this patient was Green.   Interventions performed were n/a.    Sepsis treatment initiated: No    Cares/treatment/interventions/medications to be completed following ED care: MD orders    ED Nurse Name: Selene Lynch RN  3:15 AM  RECEIVING UNIT ED HANDOFF REVIEW    Above ED Nurse Handoff Report was reviewed: Yes  Reviewed by: Afua Vizcaino RN on June 28, 2024 at 5:51 AM   I Vocera called the ED to inform them the note was read: No

## 2024-06-29 ENCOUNTER — ANESTHESIA (OUTPATIENT)
Dept: SURGERY | Facility: CLINIC | Age: 34
DRG: 337 | End: 2024-06-29
Payer: COMMERCIAL

## 2024-06-29 ENCOUNTER — APPOINTMENT (OUTPATIENT)
Dept: GENERAL RADIOLOGY | Facility: CLINIC | Age: 34
DRG: 337 | End: 2024-06-29
Attending: SURGERY
Payer: COMMERCIAL

## 2024-06-29 ENCOUNTER — ANESTHESIA EVENT (OUTPATIENT)
Dept: SURGERY | Facility: CLINIC | Age: 34
DRG: 337 | End: 2024-06-29
Payer: COMMERCIAL

## 2024-06-29 LAB
ANION GAP SERPL CALCULATED.3IONS-SCNC: 13 MMOL/L (ref 7–15)
BUN SERPL-MCNC: 23.8 MG/DL (ref 6–20)
CALCIUM SERPL-MCNC: 9 MG/DL (ref 8.6–10)
CHLORIDE SERPL-SCNC: 106 MMOL/L (ref 98–107)
CREAT SERPL-MCNC: 0.84 MG/DL (ref 0.67–1.17)
DEPRECATED HCO3 PLAS-SCNC: 22 MMOL/L (ref 22–29)
EGFRCR SERPLBLD CKD-EPI 2021: >90 ML/MIN/1.73M2
GLUCOSE SERPL-MCNC: 103 MG/DL (ref 70–99)
POTASSIUM SERPL-SCNC: 4.2 MMOL/L (ref 3.4–5.3)
SODIUM SERPL-SCNC: 141 MMOL/L (ref 135–145)

## 2024-06-29 PROCEDURE — 99232 SBSQ HOSP IP/OBS MODERATE 35: CPT | Performed by: STUDENT IN AN ORGANIZED HEALTH CARE EDUCATION/TRAINING PROGRAM

## 2024-06-29 PROCEDURE — 44180 LAP ENTEROLYSIS: CPT | Performed by: SURGERY

## 2024-06-29 PROCEDURE — 250N000009 HC RX 250: Performed by: NURSE ANESTHETIST, CERTIFIED REGISTERED

## 2024-06-29 PROCEDURE — 80048 BASIC METABOLIC PNL TOTAL CA: CPT | Performed by: INTERNAL MEDICINE

## 2024-06-29 PROCEDURE — 710N000009 HC RECOVERY PHASE 1, LEVEL 1, PER MIN: Performed by: SURGERY

## 2024-06-29 PROCEDURE — 250N000013 HC RX MED GY IP 250 OP 250 PS 637: Performed by: SURGERY

## 2024-06-29 PROCEDURE — 250N000011 HC RX IP 250 OP 636: Performed by: STUDENT IN AN ORGANIZED HEALTH CARE EDUCATION/TRAINING PROGRAM

## 2024-06-29 PROCEDURE — 74018 RADEX ABDOMEN 1 VIEW: CPT

## 2024-06-29 PROCEDURE — 250N000011 HC RX IP 250 OP 636: Performed by: NURSE ANESTHETIST, CERTIFIED REGISTERED

## 2024-06-29 PROCEDURE — 8E0W4CZ ROBOTIC ASSISTED PROCEDURE OF TRUNK REGION, PERCUTANEOUS ENDOSCOPIC APPROACH: ICD-10-PCS | Performed by: SURGERY

## 2024-06-29 PROCEDURE — 360N000080 HC SURGERY LEVEL 7, PER MIN: Performed by: SURGERY

## 2024-06-29 PROCEDURE — 272N000001 HC OR GENERAL SUPPLY STERILE: Performed by: SURGERY

## 2024-06-29 PROCEDURE — 0DN84ZZ RELEASE SMALL INTESTINE, PERCUTANEOUS ENDOSCOPIC APPROACH: ICD-10-PCS | Performed by: SURGERY

## 2024-06-29 PROCEDURE — 258N000003 HC RX IP 258 OP 636: Performed by: SURGERY

## 2024-06-29 PROCEDURE — 120N000001 HC R&B MED SURG/OB

## 2024-06-29 PROCEDURE — 250N000011 HC RX IP 250 OP 636: Performed by: SURGERY

## 2024-06-29 PROCEDURE — 250N000025 HC SEVOFLURANE, PER MIN: Performed by: SURGERY

## 2024-06-29 PROCEDURE — 44180 LAP ENTEROLYSIS: CPT | Mod: AS | Performed by: PHYSICIAN ASSISTANT

## 2024-06-29 PROCEDURE — 370N000017 HC ANESTHESIA TECHNICAL FEE, PER MIN: Performed by: SURGERY

## 2024-06-29 PROCEDURE — 999N000141 HC STATISTIC PRE-PROCEDURE NURSING ASSESSMENT: Performed by: SURGERY

## 2024-06-29 PROCEDURE — 36415 COLL VENOUS BLD VENIPUNCTURE: CPT | Performed by: INTERNAL MEDICINE

## 2024-06-29 PROCEDURE — 250N000011 HC RX IP 250 OP 636: Performed by: INTERNAL MEDICINE

## 2024-06-29 PROCEDURE — 258N000003 HC RX IP 258 OP 636: Performed by: STUDENT IN AN ORGANIZED HEALTH CARE EDUCATION/TRAINING PROGRAM

## 2024-06-29 PROCEDURE — 258N000003 HC RX IP 258 OP 636: Performed by: NURSE ANESTHETIST, CERTIFIED REGISTERED

## 2024-06-29 RX ORDER — HYDRALAZINE HYDROCHLORIDE 20 MG/ML
2.5-5 INJECTION INTRAMUSCULAR; INTRAVENOUS EVERY 10 MIN PRN
Status: DISCONTINUED | OUTPATIENT
Start: 2024-06-29 | End: 2024-06-29 | Stop reason: HOSPADM

## 2024-06-29 RX ORDER — DEXTROSE MONOHYDRATE, SODIUM CHLORIDE, AND POTASSIUM CHLORIDE 50; 1.49; 4.5 G/1000ML; G/1000ML; G/1000ML
INJECTION, SOLUTION INTRAVENOUS CONTINUOUS
Status: DISCONTINUED | OUTPATIENT
Start: 2024-06-29 | End: 2024-07-01

## 2024-06-29 RX ORDER — SODIUM CHLORIDE, SODIUM LACTATE, POTASSIUM CHLORIDE, CALCIUM CHLORIDE 600; 310; 30; 20 MG/100ML; MG/100ML; MG/100ML; MG/100ML
INJECTION, SOLUTION INTRAVENOUS CONTINUOUS PRN
Status: DISCONTINUED | OUTPATIENT
Start: 2024-06-29 | End: 2024-06-29

## 2024-06-29 RX ORDER — DEXAMETHASONE SODIUM PHOSPHATE 4 MG/ML
INJECTION, SOLUTION INTRA-ARTICULAR; INTRALESIONAL; INTRAMUSCULAR; INTRAVENOUS; SOFT TISSUE PRN
Status: DISCONTINUED | OUTPATIENT
Start: 2024-06-29 | End: 2024-06-29

## 2024-06-29 RX ORDER — GLYCOPYRROLATE 0.2 MG/ML
INJECTION, SOLUTION INTRAMUSCULAR; INTRAVENOUS PRN
Status: DISCONTINUED | OUTPATIENT
Start: 2024-06-29 | End: 2024-06-29

## 2024-06-29 RX ORDER — CEFAZOLIN SODIUM 2 G/100ML
2 INJECTION, SOLUTION INTRAVENOUS SEE ADMIN INSTRUCTIONS
Status: DISCONTINUED | OUTPATIENT
Start: 2024-06-29 | End: 2024-06-29 | Stop reason: HOSPADM

## 2024-06-29 RX ORDER — BUPIVACAINE HYDROCHLORIDE 5 MG/ML
INJECTION, SOLUTION EPIDURAL; INTRACAUDAL PRN
Status: DISCONTINUED | OUTPATIENT
Start: 2024-06-29 | End: 2024-06-29 | Stop reason: HOSPADM

## 2024-06-29 RX ORDER — PROPOFOL 10 MG/ML
INJECTION, EMULSION INTRAVENOUS PRN
Status: DISCONTINUED | OUTPATIENT
Start: 2024-06-29 | End: 2024-06-29

## 2024-06-29 RX ORDER — KETOROLAC TROMETHAMINE 30 MG/ML
30 INJECTION, SOLUTION INTRAMUSCULAR; INTRAVENOUS EVERY 6 HOURS PRN
Status: DISCONTINUED | OUTPATIENT
Start: 2024-06-29 | End: 2024-07-01

## 2024-06-29 RX ORDER — ONDANSETRON 2 MG/ML
INJECTION INTRAMUSCULAR; INTRAVENOUS PRN
Status: DISCONTINUED | OUTPATIENT
Start: 2024-06-29 | End: 2024-06-29

## 2024-06-29 RX ORDER — ONDANSETRON 4 MG/1
4 TABLET, ORALLY DISINTEGRATING ORAL EVERY 30 MIN PRN
Status: DISCONTINUED | OUTPATIENT
Start: 2024-06-29 | End: 2024-06-29 | Stop reason: HOSPADM

## 2024-06-29 RX ORDER — ACETAMINOPHEN 325 MG/1
975 TABLET ORAL ONCE
Status: DISCONTINUED | OUTPATIENT
Start: 2024-06-29 | End: 2024-06-29 | Stop reason: HOSPADM

## 2024-06-29 RX ORDER — FENTANYL CITRATE 50 UG/ML
50 INJECTION, SOLUTION INTRAMUSCULAR; INTRAVENOUS EVERY 5 MIN PRN
Status: DISCONTINUED | OUTPATIENT
Start: 2024-06-29 | End: 2024-06-29 | Stop reason: HOSPADM

## 2024-06-29 RX ORDER — KETOROLAC TROMETHAMINE 30 MG/ML
INJECTION, SOLUTION INTRAMUSCULAR; INTRAVENOUS PRN
Status: DISCONTINUED | OUTPATIENT
Start: 2024-06-29 | End: 2024-06-29

## 2024-06-29 RX ORDER — KETOROLAC TROMETHAMINE 15 MG/ML
15 INJECTION, SOLUTION INTRAMUSCULAR; INTRAVENOUS
Status: DISCONTINUED | OUTPATIENT
Start: 2024-06-29 | End: 2024-06-29 | Stop reason: HOSPADM

## 2024-06-29 RX ORDER — CEFAZOLIN SODIUM 2 G/100ML
2 INJECTION, SOLUTION INTRAVENOUS
Status: COMPLETED | OUTPATIENT
Start: 2024-06-29 | End: 2024-06-29

## 2024-06-29 RX ORDER — ONDANSETRON 2 MG/ML
4 INJECTION INTRAMUSCULAR; INTRAVENOUS EVERY 30 MIN PRN
Status: DISCONTINUED | OUTPATIENT
Start: 2024-06-29 | End: 2024-06-29 | Stop reason: HOSPADM

## 2024-06-29 RX ORDER — FENTANYL CITRATE 50 UG/ML
INJECTION, SOLUTION INTRAMUSCULAR; INTRAVENOUS PRN
Status: DISCONTINUED | OUTPATIENT
Start: 2024-06-29 | End: 2024-06-29

## 2024-06-29 RX ORDER — METOPROLOL TARTRATE 1 MG/ML
1-2 INJECTION, SOLUTION INTRAVENOUS EVERY 5 MIN PRN
Status: DISCONTINUED | OUTPATIENT
Start: 2024-06-29 | End: 2024-06-29 | Stop reason: HOSPADM

## 2024-06-29 RX ORDER — PROPOFOL 10 MG/ML
INJECTION, EMULSION INTRAVENOUS CONTINUOUS PRN
Status: DISCONTINUED | OUTPATIENT
Start: 2024-06-29 | End: 2024-06-29

## 2024-06-29 RX ORDER — FENTANYL CITRATE 50 UG/ML
25 INJECTION, SOLUTION INTRAMUSCULAR; INTRAVENOUS EVERY 5 MIN PRN
Status: DISCONTINUED | OUTPATIENT
Start: 2024-06-29 | End: 2024-06-29 | Stop reason: HOSPADM

## 2024-06-29 RX ORDER — LIDOCAINE HYDROCHLORIDE 20 MG/ML
INJECTION, SOLUTION INFILTRATION; PERINEURAL PRN
Status: DISCONTINUED | OUTPATIENT
Start: 2024-06-29 | End: 2024-06-29

## 2024-06-29 RX ORDER — SODIUM CHLORIDE, SODIUM LACTATE, POTASSIUM CHLORIDE, CALCIUM CHLORIDE 600; 310; 30; 20 MG/100ML; MG/100ML; MG/100ML; MG/100ML
INJECTION, SOLUTION INTRAVENOUS CONTINUOUS
Status: DISCONTINUED | OUTPATIENT
Start: 2024-06-29 | End: 2024-06-29 | Stop reason: HOSPADM

## 2024-06-29 RX ORDER — DEXAMETHASONE SODIUM PHOSPHATE 4 MG/ML
4 INJECTION, SOLUTION INTRA-ARTICULAR; INTRALESIONAL; INTRAMUSCULAR; INTRAVENOUS; SOFT TISSUE
Status: DISCONTINUED | OUTPATIENT
Start: 2024-06-29 | End: 2024-06-29 | Stop reason: HOSPADM

## 2024-06-29 RX ORDER — NALOXONE HYDROCHLORIDE 0.4 MG/ML
0.1 INJECTION, SOLUTION INTRAMUSCULAR; INTRAVENOUS; SUBCUTANEOUS
Status: DISCONTINUED | OUTPATIENT
Start: 2024-06-29 | End: 2024-06-29 | Stop reason: HOSPADM

## 2024-06-29 RX ORDER — HYDROMORPHONE HCL IN WATER/PF 6 MG/30 ML
0.2 PATIENT CONTROLLED ANALGESIA SYRINGE INTRAVENOUS EVERY 5 MIN PRN
Status: DISCONTINUED | OUTPATIENT
Start: 2024-06-29 | End: 2024-06-29 | Stop reason: HOSPADM

## 2024-06-29 RX ORDER — HYDROMORPHONE HYDROCHLORIDE 1 MG/ML
0.3 INJECTION, SOLUTION INTRAMUSCULAR; INTRAVENOUS; SUBCUTANEOUS
Status: DISCONTINUED | OUTPATIENT
Start: 2024-06-29 | End: 2024-06-30

## 2024-06-29 RX ADMIN — ROCURONIUM BROMIDE 20 MG: 50 INJECTION, SOLUTION INTRAVENOUS at 14:26

## 2024-06-29 RX ADMIN — HYDROMORPHONE HYDROCHLORIDE 0.5 MG: 1 INJECTION, SOLUTION INTRAMUSCULAR; INTRAVENOUS; SUBCUTANEOUS at 02:10

## 2024-06-29 RX ADMIN — HYDROMORPHONE HYDROCHLORIDE 0.5 MG: 1 INJECTION, SOLUTION INTRAMUSCULAR; INTRAVENOUS; SUBCUTANEOUS at 13:17

## 2024-06-29 RX ADMIN — SODIUM CHLORIDE, POTASSIUM CHLORIDE, SODIUM LACTATE AND CALCIUM CHLORIDE: 600; 310; 30; 20 INJECTION, SOLUTION INTRAVENOUS at 14:33

## 2024-06-29 RX ADMIN — PANTOPRAZOLE SODIUM 40 MG: 40 INJECTION, POWDER, FOR SOLUTION INTRAVENOUS at 06:53

## 2024-06-29 RX ADMIN — PROCHLORPERAZINE EDISYLATE 5 MG: 5 INJECTION INTRAMUSCULAR; INTRAVENOUS at 02:10

## 2024-06-29 RX ADMIN — ONDANSETRON 4 MG: 2 INJECTION INTRAMUSCULAR; INTRAVENOUS at 10:09

## 2024-06-29 RX ADMIN — HYDROMORPHONE HYDROCHLORIDE 0.3 MG: 1 INJECTION, SOLUTION INTRAMUSCULAR; INTRAVENOUS; SUBCUTANEOUS at 22:24

## 2024-06-29 RX ADMIN — DEXAMETHASONE SODIUM PHOSPHATE 4 MG: 4 INJECTION, SOLUTION INTRA-ARTICULAR; INTRALESIONAL; INTRAMUSCULAR; INTRAVENOUS; SOFT TISSUE at 12:56

## 2024-06-29 RX ADMIN — ONDANSETRON 4 MG: 2 INJECTION INTRAMUSCULAR; INTRAVENOUS at 15:47

## 2024-06-29 RX ADMIN — ROCURONIUM BROMIDE 50 MG: 50 INJECTION, SOLUTION INTRAVENOUS at 12:59

## 2024-06-29 RX ADMIN — ACETAMINOPHEN 975 MG: 325 TABLET, FILM COATED ORAL at 18:18

## 2024-06-29 RX ADMIN — SUGAMMADEX 200 MG: 100 INJECTION, SOLUTION INTRAVENOUS at 15:47

## 2024-06-29 RX ADMIN — PROPOFOL 200 MG: 10 INJECTION, EMULSION INTRAVENOUS at 12:56

## 2024-06-29 RX ADMIN — HYDROMORPHONE HYDROCHLORIDE 0.5 MG: 1 INJECTION, SOLUTION INTRAMUSCULAR; INTRAVENOUS; SUBCUTANEOUS at 10:03

## 2024-06-29 RX ADMIN — SODIUM CHLORIDE, POTASSIUM CHLORIDE, SODIUM LACTATE AND CALCIUM CHLORIDE: 600; 310; 30; 20 INJECTION, SOLUTION INTRAVENOUS at 05:15

## 2024-06-29 RX ADMIN — GLYCOPYRROLATE 0.2 MG: 0.2 INJECTION, SOLUTION INTRAMUSCULAR; INTRAVENOUS at 12:56

## 2024-06-29 RX ADMIN — Medication 100 MG: at 12:56

## 2024-06-29 RX ADMIN — HYDROMORPHONE HYDROCHLORIDE 0.5 MG: 1 INJECTION, SOLUTION INTRAMUSCULAR; INTRAVENOUS; SUBCUTANEOUS at 13:11

## 2024-06-29 RX ADMIN — LIDOCAINE HYDROCHLORIDE 50 MG: 20 INJECTION, SOLUTION INFILTRATION; PERINEURAL at 12:56

## 2024-06-29 RX ADMIN — ROCURONIUM BROMIDE 10 MG: 50 INJECTION, SOLUTION INTRAVENOUS at 13:21

## 2024-06-29 RX ADMIN — KETOROLAC TROMETHAMINE 15 MG: 30 INJECTION, SOLUTION INTRAMUSCULAR at 15:47

## 2024-06-29 RX ADMIN — FENTANYL CITRATE 100 MCG: 50 INJECTION INTRAMUSCULAR; INTRAVENOUS at 12:56

## 2024-06-29 RX ADMIN — POTASSIUM CHLORIDE, DEXTROSE MONOHYDRATE AND SODIUM CHLORIDE: 150; 5; 450 INJECTION, SOLUTION INTRAVENOUS at 17:54

## 2024-06-29 RX ADMIN — HYDROMORPHONE HYDROCHLORIDE 0.5 MG: 1 INJECTION, SOLUTION INTRAMUSCULAR; INTRAVENOUS; SUBCUTANEOUS at 05:12

## 2024-06-29 RX ADMIN — KETOROLAC TROMETHAMINE 15 MG: 15 INJECTION, SOLUTION INTRAMUSCULAR; INTRAVENOUS at 07:58

## 2024-06-29 RX ADMIN — CEFAZOLIN SODIUM 2 G: 2 INJECTION, SOLUTION INTRAVENOUS at 12:49

## 2024-06-29 RX ADMIN — SODIUM CHLORIDE, POTASSIUM CHLORIDE, SODIUM LACTATE AND CALCIUM CHLORIDE: 600; 310; 30; 20 INJECTION, SOLUTION INTRAVENOUS at 12:34

## 2024-06-29 RX ADMIN — PROPOFOL 75 MCG/KG/MIN: 10 INJECTION, EMULSION INTRAVENOUS at 12:59

## 2024-06-29 ASSESSMENT — ACTIVITIES OF DAILY LIVING (ADL)
ADLS_ACUITY_SCORE: 27
ADLS_ACUITY_SCORE: 20
ADLS_ACUITY_SCORE: 27
ADLS_ACUITY_SCORE: 20
ADLS_ACUITY_SCORE: 20
ADLS_ACUITY_SCORE: 27
ADLS_ACUITY_SCORE: 20

## 2024-06-29 NOTE — BRIEF OP NOTE
[facility]    Brief Operative Note    Pre-operative diagnosis: SBO (small bowel obstruction) (H) [K56.609]  Post-operative diagnosis Same as pre-operative diagnosis    Procedure: LAPAROSCOPY, DIAGNOSTIC, N/A - Abdomen  LYSIS, ADHESIONS, ROBOT-ASSISTED, N/A - Pelvis    Surgeon: Surgeons and Role:     * Birgit Ceballos MD - Primary     * Matti Tian PA-C - Assisting  Anesthesia: General   Estimated Blood Loss: 20ml    Drains: None  Specimens: * No specimens in log *  Findings:   Copious interloop small bowel and colon adhesions. Minimal adhesions to the abdominal wall. Lysed adhesions to area of suspected obstructive point  Oversew of 3 serosal tears.  Complications: none .  Implants: * No implants in log *

## 2024-06-29 NOTE — ANESTHESIA PROCEDURE NOTES
Airway       Patient location during procedure: OR       Procedure Start/Stop Times: 6/29/2024 12:57 PM  Staff -        CRNA: Benjamin Asher APRN CRNA       Performed By: CRNA  Consent for Airway        Urgency: elective  Indications and Patient Condition       Indications for airway management: dilia-procedural       Induction type:RSI       Mask difficulty assessment: 0 - not attempted    Final Airway Details       Final airway type: endotracheal airway       Successful airway: ETT - single  Endotracheal Airway Details        ETT size (mm): 8.0       Cuffed: yes       Successful intubation technique: video laryngoscopy       VL Blade Size: Glidescope 4       Grade View of Cords: 1       Adjucts: stylet       Position: Right       Measured from: lips       Secured at (cm): 22    Post intubation assessment        Placement verified by: capnometry, equal breath sounds and chest rise        Number of attempts at approach: 1       Number of other approaches attempted: 0       Secured with: tape       Ease of procedure: easy       Dentition: Intact and Unchanged    Medication(s) Administered   Medication Administration Time: 6/29/2024 12:57 PM

## 2024-06-29 NOTE — OP NOTE
[facility] General Surgery Operative Note    Pre-operative diagnosis: Adhesive small bowel obstruction   Post-operative diagnosis: same   Procedure: Robotic assisted laparoscopic lysis of adhesions   Surgeon: Birgit Ceballos MD   Assistant(s): Matti Tian PA-C  The Physician Assistant was medically necessary for their expertise in prepping, robotic instrument exchange, passing of material through port sites, closure of port sites, suturing and retraction.   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 He agreed to proceed with robotic lysis of adhesions, possible laparotomy after hearing the risks and benefits.    DESCRIPTION OF PROCEDURE:  Patient was brought to the operating room, placed on the operating table in supine position. Anesthesia was induced. His pressure points were padded and he was secured with a safety strap.  The abdomen was prepped and draped in the standard sterile fashion.  A timeout was called to verify the patient, site of procedure and procedure to be performed.    Access of the abdomen was gained with an Optiview trocar in left upper quadrant at Carmichael's point.  Pneumoperitoneum was established and there were no bowel injuries. There were some adhesions to the anterior abdominal wall only in the upper abdomen. 4 robotic ports were placed across the right abdomen as the suspected adhesion was in the left mid abdomen.  The abdomen was surveyed and copious dilated bowel loops and interloop bowel adhesions were noted.    We began by lysing adhesions around the area of the presumed obstruction in the left mid abdomen above the umbilicus. The colon in the upper abdomen and omentum were adhesed over this and was released. Slowly and meticulously we continued  to take down interloop adhesions until we identified an area where the bowel was stuck in a 180 degrees to itself and appeared less dilated after this point. This spot was in the expected location of transition point on the CT scan. A few additional adhesions were lysed. Copious interloop adhesions remained but we elected to leave these alone. The bowel we had freed up was then run and 3 serosal tears were oversewn with interrupted inverting 3-0 vicryl sutures.    The trocars were then removed and pneumoperitoneum released. 0.25% marcaine was injected along the fascia and subcutaneous tissues. The skin was closed with 4-0 suture. Sterile dressings were applied. At the end of the operation, all sponge, instrument, and needle counts were correct.     Patient was awoken and transferred to PACU in stable condition.      FINDINGS: Copious interloop bowel adhesions. Lysed adhesions to slightly more decompressed loops which correlated to the CT scan transition point.     Birgit Ceballos MD

## 2024-06-29 NOTE — PROGRESS NOTES
Surgery Progress    Pt states still feeling terrible, having a lot of ongoing pain, partially managed with dilaudid. NG was clamped all night during gastrografin challenge. Mostly left sided abdominal pain and nausea ongoing.     O  /75 (BP Location: Right arm)   Pulse 65   Temp 97.3  F (36.3  C) (Temporal)   Resp 18   Wt 102.8 kg (226 lb 10.1 oz)   SpO2 94%   BMI 29.90 kg/m    Abdomen is soft, mildly distended, tender focally on the left side    Gastrografin challenge: no contrast in colon on 8 hr  AXR this am: contrast throughout colon but still dilated bowel loops and contrast in the stomach     A/P recurrent SBO. Appears to be a partial SBO at this point. Patient still having a fair amount of pain, despite some contrast passing and small amounts of bowel function. We discussed options, since it is not a complete obstruction, and no obvious bowel ischemia, would be reasonable to continue conservative management. He prefers operative management today given his ongoing somewhat severe pain. I think this is also reasonable as there is clearly a partial obstruction, this is his 2nd SBO in 6 months and it appears to be in the same spot on the last CT. We discussed surgery would be diagnostic laparoscopy, would attempt robotic lysis of adhesions, and possible laparotomy, discussed possible small bowel resection. He would like to proceed this afternoon.  Birgit Ceballos MD

## 2024-06-29 NOTE — PROGRESS NOTES
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  Per patient this was his first diagnosed small bowel obstruction.  He may have had prior undiagnosed small bowel obstructions in the past which resolved on its own.  He presented to the ER with complaint of abdominal pain, nausea and vomiting.  His pain is localized mostly to the left side of abdomen.  He has had 10 episodes of vomiting.  After receiving Toradol he felt much better.  We discussed NG tube placement.  He currently feels fine however if his pain comes back or he has another episode of nausea/vomiting will place NG tube.  General surgery consultation requested.  -Gastrograffin morning of 6/29/2024 with contrast partially into colon  -Significant pain after clamped overnight  -Surgery offered intervention this afternoon, which patient opted to do rather than continue attempt at conservative management.     Leukocytosis   WBC at 12.8.  Likely stress demargination in the setting of SBO.  Low suspicion for infection.  WBC count improved to 7.6 on 6/28/2024           Diet: NPO for Medical/Clinical Reasons Except for: Ice Chips, Meds    DVT Prophylaxis: Low Risk/Ambulatory with no VTE prophylaxis indicated  Muniz Catheter: Not present  Lines: None  "    Cardiac Monitoring: None  Code Status: Full Code              Clinically Significant Risk Factors                                            Disposition Plan     Medically Ready for Discharge: Anticipated in 2-4 Days after bowel obstruction resolves and is able to tolerate diet             Cameron Cortes MD  Hospitalist Service  [facility]  Securely message with [employer] (more info)  Text page via [employer] Paging/Directory   ______________________________________________________________________    Interval History   Chart reviewed  Patient is resting comfortably in bed.  Intermittent abdominal pain.  No nausea.  Seen by general surgery team.  Planning on Gastrografin study and NG tube placement.  No other acute issues    Physical Exam   Temp: 98  F (36.7  C) Temp src: Temporal BP: 132/88 Pulse: 74   Resp: 16 SpO2: 98 % O2 Device: None (Room air)     Weight: 102.8 kg (226 lb 10.1 oz)  Estimated body mass index is 29.9 kg/m  as calculated from the following:    Height as of 1/25/24: 1.854 m (6' 1\").    Weight as of this encounter: 102.8 kg (226 lb 10.1 oz).    General: Very pleasant male resting comfortably in hospital bed.  Awake, alert, interactive.  HEENT: Normocephalic, atraumatic.  PERRL, EOMI.  Conjunctiva clear, sclerae anicteric.  Mucous membranes moist.  Respiratory: Normal work of breathing.    GI: NG draining bilious fluid.  : Deferred.  Musculoskeletal: Moving all extremities appropriately.  Skin: No rashes or abrasions on exposed skin.  Neurologic: Alert and oriented x4.  Cranial nerves II through XII grossly intact.  Psychologic: Appropriate mood and affect.      Medical Decision Making       45 MINUTES SPENT BY ME on the date of service doing chart review, history, exam, documentation & further activities per the note.      Data     I have personally reviewed the following data over the past 24 hrs:    N/A  \   N/A   / N/A     141 106 23.8 (H) /  103 (H)   4.2 22 0.84 \       Imaging " results reviewed over the past 24 hrs:   Recent Results (from the past 24 hour(s))   XR Gastrografin Challenge    Narrative    EXAM: XR GASTROGRAFIN CHALLENGE  LOCATION: [facility]  DATE: 6/28/2024    INDICATION: Small Bowel Obstruction  COMPARISON: CT 6/28/2024.  TECHNIQUE: Routine water soluble contrast follow-through challenge.      Impression    IMPRESSION:  Administered enteric contrast fills numerous dilated loops of small bowel throughout the abdomen or pelvis. Contrast is not seen within the colon 8 hours post administration. Nasogastric tube in the stomach. Moderate amount of oral contrast remains in   the stomach. Findings are compatible with ongoing obstruction.   XR Abdomen 1 View    Narrative    EXAM: XR ABDOMEN 1 VIEW  LOCATION: [facility]  DATE: 6/29/2024    INDICATION: SBO, follow up gastrografin  COMPARISON: Abdominal radiograph 6/20/2024.       Impression    IMPRESSION:     Gastric drainage tube remains within the proximal stomach.    Previously administered oral contrast material has progressed into the colon and extends into the proximal sigmoid colon. A few mildly dilated small bowel loops remain within the central abdomen which have overall improved since yesterday's exam.   Findings are compatible with a resolving or partial small bowel obstruction.     No extraluminal contrast is identified.

## 2024-06-29 NOTE — ANESTHESIA PREPROCEDURE EVALUATION
Anesthesia Pre-Procedure Evaluation    Patient: Raji Barker   MRN: [id number] : 1990        Procedure : Procedure(s):  LAPAROSCOPY, DIAGNOSTIC, possible laparotomy  LYSIS, ADHESIONS, ROBOT-ASSISTED          Past Medical History:   Diagnosis Date    Congenital malrotation of intestine (H28)     repaired in the first week of life    Gastroesophageal reflux disease without esophagitis       Past Surgical History:   Procedure Laterality Date    ABDOMEN SURGERY      congenital malrotation of the bowel repaired as an infant      No Known Allergies   Social History     Tobacco Use    Smoking status: Never    Smokeless tobacco: Never   Substance Use Topics    Alcohol use: Yes     Comment: occasionally      Wt Readings from Last 1 Encounters:   24 102.8 kg (226 lb 10.1 oz)        Anesthesia Evaluation   Pt has had prior anesthetic. Type: General.        ROS/MED HX  ENT/Pulmonary:  - neg pulmonary ROS     Neurologic:  - neg neurologic ROS     Cardiovascular:       METS/Exercise Tolerance: >4 METS    Hematologic: Comments: Lab Test        24                       0803          0121          2317          WBC          7.6          13.6*        12.1*         HGB          15.2         15.8         17.3          MCV          89           88           87            PLT          209          214          240            Lab Test        24                       0704          0803          0121          NA           141          138          138           POTASSIUM    4.2          4.4          3.8           CHLORIDE     106          106          102           CO2          22           21*          24            BUN          23.8*        18.5         20.8*         CR           0.84         0.74         0.99          ANIONGAP     13           11           12            QUINTON          9.0          8.9          9.7           GLC          103*         101*          "120*                Musculoskeletal:  - neg musculoskeletal ROS     GI/Hepatic: Comment: Congenital malrotation of intestine (H28)   repaired in the first week of life      small bowel obstruction    (+) GERD, Asymptomatic on medication,                  Renal/Genitourinary:  - neg Renal ROS     Endo:  - neg endo ROS     Psychiatric/Substance Use:  - neg psychiatric ROS     Infectious Disease:  - neg infectious disease ROS     Malignancy:  - neg malignancy ROS     Other:  - neg other ROS          Physical Exam    Airway        Mallampati: II   TM distance: > 3 FB   Neck ROM: full   Mouth opening: > 3 cm    Respiratory Devices and Support         Dental       (+) Minor Abnormalities - some fillings, tiny chips      Cardiovascular   cardiovascular exam normal          Pulmonary   pulmonary exam normal                OUTSIDE LABS:  CBC:   Lab Results   Component Value Date    WBC 7.6 06/28/2024    WBC 13.6 (H) 06/28/2024    HGB 15.2 06/28/2024    HGB 15.8 06/28/2024    HCT 43.7 06/28/2024    HCT 44.9 06/28/2024     06/28/2024     06/28/2024     BMP:   Lab Results   Component Value Date     06/29/2024     06/28/2024    POTASSIUM 4.2 06/29/2024    POTASSIUM 4.4 06/28/2024    CHLORIDE 106 06/29/2024    CHLORIDE 106 06/28/2024    CO2 22 06/29/2024    CO2 21 (L) 06/28/2024    BUN 23.8 (H) 06/29/2024    BUN 18.5 06/28/2024    CR 0.84 06/29/2024    CR 0.74 06/28/2024     (H) 06/29/2024     (H) 06/28/2024     COAGS: No results found for: \"PTT\", \"INR\", \"FIBR\"  POC: No results found for: \"BGM\", \"HCG\", \"HCGS\"  HEPATIC:   Lab Results   Component Value Date    ALBUMIN 4.7 06/28/2024    PROTTOTAL 7.7 06/28/2024    ALT 51 06/28/2024    AST 45 06/28/2024    ALKPHOS 58 06/28/2024    BILITOTAL 0.4 06/28/2024     OTHER:   Lab Results   Component Value Date    LACT 1.6 12/27/2023    QUINTON 9.0 06/29/2024    LIPASE 20 06/28/2024       Anesthesia Plan    ASA Status:  2    NPO Status:  NPO Appropriate  "   Anesthesia Type: General.     - Airway: ETT   Induction: Intravenous, Propofol.   Maintenance: Balanced.   Techniques and Equipment:     - Airway: Video-Laryngoscope       Consents    Anesthesia Plan(s) and associated risks, benefits, and realistic alternatives discussed. Questions answered and patient/representative(s) expressed understanding.     - Discussed:     - Discussed with:  Patient      - Extended Intubation/Ventilatory Support Discussed: No.      - Patient is DNR/DNI Status: No     Use of blood products discussed: Yes.     - Discussed with: Patient.     - Consented: consented to blood products     Postoperative Care    Pain management: IV analgesics.   PONV prophylaxis: Dexamethasone or Solumedrol, Ondansetron (or other 5HT-3)     Comments:    Other Comments: RSI           Jayce Armenta MD    I have reviewed the pertinent notes and labs in the chart from the past 30 days and (re)examined the patient.  Any updates or changes from those notes are reflected in this note.

## 2024-06-29 NOTE — PLAN OF CARE
Goal Outcome Evaluation:      Plan of Care Reviewed With: patient, spouse          Outcome Evaluation: pain level is a 5. taking toradol and dilaudid iv. cramping and aching in abdomen L>R side. pt is leaning towards surgery today. NG to  out in container. NPO,. voiding. independent in room . nauseated --medicated with zofran.      VS-afebrile, stable,   Lung Sounds-clear, no cough, on room air. Taking deep breathes.   O2-on room air.   GI-- pt reports he had a very small loose brown bm at 0430 today. Faint bs heard. -flatus. Npo. +nauseated medicated with zofran. 700 out of NG tube. Thin brown. No vomit.  -voiding. No difficulties.   IVF-at 125.   Dressings-none.   CMS-denies numbness and tingling, +pp, no swelling noted.   Drain-none.   Activity-pt turning in bed. Ambulating to the bathroom to void. Down to xray via w/c this morning. Sleeping in between cares  Pain-rates pain level a 5 down to a 3. On iv toradol and dilaudid.   D/C Plan-to be determined. Pt was thinking about surgery today. Decided that he would like to proceed. On OR schedule at 1240. Wife present.   Down to OR at 1210, via w/c wife present. NG clamped. Iv sl.

## 2024-06-29 NOTE — ANESTHESIA POSTPROCEDURE EVALUATION
Patient: Raji Barker    Procedure: Procedure(s):  LAPAROSCOPY, DIAGNOSTIC  LYSIS, ADHESIONS, ROBOT-ASSISTED       Anesthesia Type:  General    Note:  Disposition: Outpatient   Postop Pain Control: Uneventful            Sign Out: Well controlled pain   PONV: No   Neuro/Psych: Uneventful            Sign Out: Acceptable/Baseline neuro status   Airway/Respiratory: Uneventful            Sign Out: Acceptable/Baseline resp. status   CV/Hemodynamics: Uneventful            Sign Out: Acceptable CV status; No obvious hypovolemia; No obvious fluid overload   Other NRE: NONE   DID A NON-ROUTINE EVENT OCCUR? No           Last vitals:  Vitals Value Taken Time   /81 06/29/24 1705   Temp 97.3  F (36.3  C) 06/29/24 1605   Pulse 72 06/29/24 1710   Resp 14 06/29/24 1710   SpO2 94 % 06/29/24 1710   Vitals shown include unfiled device data.    Electronically Signed By: Jayce Armenta MD  June 29, 2024  5:48 PM

## 2024-06-29 NOTE — PROVIDER NOTIFICATION
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 GG challenge was done at 1140 pm-12 midnight. CARLINE was clamped since. xray was ordered for this morning. not yet done. How do I proceed ?     Go to xray first then NG to LIS when he returns.

## 2024-06-29 NOTE — PLAN OF CARE
"Goal Outcome Evaluation:      Plan of Care Reviewed With: patient    Overall Patient Progress: no changeOverall Patient Progress: no change    Outcome Evaluation: Pt AOx4. Independent in room. NG tube clamped. LR 125ml/hr. Pain managed with iv dilaudid and iv toradol. Nausea managed with iv compazine and iv zofran. NPO. Voiding spontaneously.      Problem: Adult Inpatient Plan of Care  Goal: Plan of Care Review  Description: The Plan of Care Review/Shift note should be completed every shift.  The Outcome Evaluation is a brief statement about your assessment that the patient is improving, declining, or no change.  This information will be displayed automatically on your shift  note.  Outcome: Progressing  Flowsheets (Taken 6/29/2024 0501)  Outcome Evaluation: Pt AOx4. Independent in room. NG tube clamped. LR 125ml/hr. Pain managed with iv dilaudid and iv toradol. Nausea managed with iv compazine and iv zofran. NPO. Voiding spontaneously.  Plan of Care Reviewed With: patient  Overall Patient Progress: no change  Goal: Patient-Specific Goal (Individualized)  Description: You can add care plan individualizations to a care plan. Examples of Individualization might be:  \"Parent requests to be called daily at 9am for status\", \"I have a hard time hearing out of my right ear\", or \"Do not touch me to wake me up as it startles  me\".  Outcome: Progressing  Goal: Absence of Hospital-Acquired Illness or Injury  Outcome: Progressing  Intervention: Identify and Manage Fall Risk  Recent Flowsheet Documentation  Taken 6/28/2024 2350 by Meaghan López, RN  Safety Promotion/Fall Prevention:   clutter free environment maintained   nonskid shoes/slippers when out of bed   room near nurse's station   safety round/check completed  Intervention: Prevent Skin Injury  Recent Flowsheet Documentation  Taken 6/28/2024 2350 by Meaghan López, RN  Body Position: position changed independently  Skin Protection: incontinence pads " utilized  Device Skin Pressure Protection:   absorbent pad utilized/changed   tubing/devices free from skin contact  Intervention: Prevent Infection  Recent Flowsheet Documentation  Taken 6/28/2024 2350 by Meaghan López RN  Infection Prevention:   single patient room provided   rest/sleep promoted  Goal: Optimal Comfort and Wellbeing  Outcome: Progressing  Intervention: Monitor Pain and Promote Comfort  Recent Flowsheet Documentation  Taken 6/28/2024 2350 by Meaghan López RN  Pain Management Interventions: medication (see MAR)  Goal: Readiness for Transition of Care  Outcome: Progressing     Problem: Pain Acute  Goal: Optimal Pain Control and Function  Outcome: Progressing  Intervention: Develop Pain Management Plan  Recent Flowsheet Documentation  Taken 6/28/2024 2350 by Meaghan López RN  Pain Management Interventions: medication (see MAR)  Intervention: Prevent or Manage Pain  Recent Flowsheet Documentation  Taken 6/28/2024 2350 by Meaghan López RN  Medication Review/Management: medications reviewed  Intervention: Optimize Psychosocial Wellbeing  Recent Flowsheet Documentation  Taken 6/28/2024 2350 by Meaghan López RN  Supportive Measures:   active listening utilized   self-care encouraged   verbalization of feelings encouraged     Problem: Intestinal Obstruction  Goal: Optimal Bowel Function  Outcome: Progressing  Intervention: Promote Bowel Function  Recent Flowsheet Documentation  Taken 6/28/2024 2350 by Meaghan López RN  Body Position: position changed independently  Head of Bed (HOB) Positioning: HOB at 20 degrees  Positioning/Transfer Devices:   pillows   in use  Goal: Fluid and Electrolyte Balance  Outcome: Progressing  Goal: Absence of Infection Signs and Symptoms  Outcome: Progressing  Goal: Optimize Nutrition Status  Outcome: Progressing  Goal: Optimal Pain Control and Function  Outcome: Progressing  Intervention: Prevent or Manage  Pain  Recent Flowsheet Documentation  Taken 6/28/2024 2350 by Meaghan López, RN  Pain Management Interventions: medication (see MAR)     Problem: Infection  Goal: Absence of Infection Signs and Symptoms  Outcome: Progressing

## 2024-06-29 NOTE — ANESTHESIA CARE TRANSFER NOTE
Patient: Raji Barker    Procedure: Procedure(s):  LAPAROSCOPY, DIAGNOSTIC  LYSIS, ADHESIONS, ROBOT-ASSISTED       Diagnosis: SBO (small bowel obstruction) (H) [K56.609]  Diagnosis Additional Information: No value filed.    Anesthesia Type:   General     Note:    Oropharynx: oropharynx clear of all foreign objects and spontaneously breathing  Level of Consciousness: drowsy  Oxygen Supplementation: face mask  Level of Supplemental Oxygen (L/min / FiO2): 10  Independent Airway: airway patency satisfactory and stable  Dentition: dentition unchanged  Vital Signs Stable: post-procedure vital signs reviewed and stable  Report to RN Given: handoff report given  Patient transferred to: PACU    Handoff Report: Identifed the Patient, Identified the Reponsible Provider, Reviewed the pertinent medical history, Discussed the surgical course, Reviewed Intra-OP anesthesia mangement and issues during anesthesia, Set expectations for post-procedure period and Allowed opportunity for questions and acknowledgement of understanding      Vitals:  Vitals Value Taken Time   /82 06/29/24 1603   Temp     Pulse 88 06/29/24 1605   Resp 13 06/29/24 1605   SpO2 96 % 06/29/24 1604   Vitals shown include unfiled device data.    Electronically Signed By: LIZBETH Ugalde CRNA  June 29, 2024  4:07 PM

## 2024-06-30 LAB
ANION GAP SERPL CALCULATED.3IONS-SCNC: 12 MMOL/L (ref 7–15)
BASOPHILS # BLD AUTO: 0 10E3/UL (ref 0–0.2)
BASOPHILS NFR BLD AUTO: 0 %
BUN SERPL-MCNC: 14.4 MG/DL (ref 6–20)
CALCIUM SERPL-MCNC: 8 MG/DL (ref 8.6–10)
CHLORIDE SERPL-SCNC: 106 MMOL/L (ref 98–107)
CREAT SERPL-MCNC: 0.79 MG/DL (ref 0.67–1.17)
DEPRECATED HCO3 PLAS-SCNC: 20 MMOL/L (ref 22–29)
EGFRCR SERPLBLD CKD-EPI 2021: >90 ML/MIN/1.73M2
EOSINOPHIL # BLD AUTO: 0.1 10E3/UL (ref 0–0.7)
EOSINOPHIL NFR BLD AUTO: 1 %
ERYTHROCYTE [DISTWIDTH] IN BLOOD BY AUTOMATED COUNT: 12.3 % (ref 10–15)
GLUCOSE SERPL-MCNC: 117 MG/DL (ref 70–99)
HCT VFR BLD AUTO: 40.8 % (ref 40–53)
HGB BLD-MCNC: 13.7 G/DL (ref 13.3–17.7)
IMM GRANULOCYTES # BLD: 0 10E3/UL
IMM GRANULOCYTES NFR BLD: 0 %
LYMPHOCYTES # BLD AUTO: 2 10E3/UL (ref 0.8–5.3)
LYMPHOCYTES NFR BLD AUTO: 24 %
MAGNESIUM SERPL-MCNC: 1.8 MG/DL (ref 1.7–2.3)
MCH RBC QN AUTO: 30.4 PG (ref 26.5–33)
MCHC RBC AUTO-ENTMCNC: 33.6 G/DL (ref 31.5–36.5)
MCV RBC AUTO: 91 FL (ref 78–100)
MONOCYTES # BLD AUTO: 1.2 10E3/UL (ref 0–1.3)
MONOCYTES NFR BLD AUTO: 14 %
NEUTROPHILS # BLD AUTO: 5 10E3/UL (ref 1.6–8.3)
NEUTROPHILS NFR BLD AUTO: 60 %
NRBC # BLD AUTO: 0 10E3/UL
NRBC BLD AUTO-RTO: 0 /100
PHOSPHATE SERPL-MCNC: 1.8 MG/DL (ref 2.5–4.5)
PHOSPHATE SERPL-MCNC: 2.8 MG/DL (ref 2.5–4.5)
PLATELET # BLD AUTO: 179 10E3/UL (ref 150–450)
POTASSIUM SERPL-SCNC: 4.1 MMOL/L (ref 3.4–5.3)
RBC # BLD AUTO: 4.5 10E6/UL (ref 4.4–5.9)
SODIUM SERPL-SCNC: 138 MMOL/L (ref 135–145)
WBC # BLD AUTO: 8.2 10E3/UL (ref 4–11)

## 2024-06-30 PROCEDURE — 85025 COMPLETE CBC W/AUTO DIFF WBC: CPT | Performed by: SURGERY

## 2024-06-30 PROCEDURE — 250N000011 HC RX IP 250 OP 636: Performed by: INTERNAL MEDICINE

## 2024-06-30 PROCEDURE — 250N000011 HC RX IP 250 OP 636: Performed by: SURGERY

## 2024-06-30 PROCEDURE — 80048 BASIC METABOLIC PNL TOTAL CA: CPT | Performed by: SURGERY

## 2024-06-30 PROCEDURE — 84100 ASSAY OF PHOSPHORUS: CPT | Performed by: SURGERY

## 2024-06-30 PROCEDURE — 250N000013 HC RX MED GY IP 250 OP 250 PS 637: Performed by: SURGERY

## 2024-06-30 PROCEDURE — 83735 ASSAY OF MAGNESIUM: CPT | Performed by: SURGERY

## 2024-06-30 PROCEDURE — 36415 COLL VENOUS BLD VENIPUNCTURE: CPT | Performed by: SURGERY

## 2024-06-30 PROCEDURE — 250N000009 HC RX 250: Performed by: SURGERY

## 2024-06-30 PROCEDURE — 258N000003 HC RX IP 258 OP 636: Performed by: SURGERY

## 2024-06-30 PROCEDURE — 120N000001 HC R&B MED SURG/OB

## 2024-06-30 RX ORDER — HYDROMORPHONE HYDROCHLORIDE 1 MG/ML
0.3 INJECTION, SOLUTION INTRAMUSCULAR; INTRAVENOUS; SUBCUTANEOUS
Status: DISCONTINUED | OUTPATIENT
Start: 2024-06-30 | End: 2024-07-01 | Stop reason: HOSPADM

## 2024-06-30 RX ORDER — OXYCODONE HYDROCHLORIDE 5 MG/1
5 TABLET ORAL EVERY 4 HOURS PRN
Status: DISCONTINUED | OUTPATIENT
Start: 2024-06-30 | End: 2024-07-01 | Stop reason: HOSPADM

## 2024-06-30 RX ADMIN — SODIUM PHOSPHATE, MONOBASIC, MONOHYDRATE AND SODIUM PHOSPHATE, DIBASIC, ANHYDROUS 15 MMOL: 142; 276 INJECTION, SOLUTION INTRAVENOUS at 12:01

## 2024-06-30 RX ADMIN — ONDANSETRON 4 MG: 2 INJECTION INTRAMUSCULAR; INTRAVENOUS at 01:55

## 2024-06-30 RX ADMIN — OXYCODONE HYDROCHLORIDE 5 MG: 5 TABLET ORAL at 18:52

## 2024-06-30 RX ADMIN — OXYCODONE HYDROCHLORIDE 5 MG: 5 TABLET ORAL at 15:00

## 2024-06-30 RX ADMIN — POTASSIUM CHLORIDE, DEXTROSE MONOHYDRATE AND SODIUM CHLORIDE: 150; 5; 450 INJECTION, SOLUTION INTRAVENOUS at 01:45

## 2024-06-30 RX ADMIN — PROCHLORPERAZINE EDISYLATE 5 MG: 5 INJECTION INTRAMUSCULAR; INTRAVENOUS at 12:10

## 2024-06-30 RX ADMIN — PANTOPRAZOLE SODIUM 40 MG: 40 INJECTION, POWDER, FOR SOLUTION INTRAVENOUS at 08:37

## 2024-06-30 RX ADMIN — HYDROMORPHONE HYDROCHLORIDE 0.3 MG: 1 INJECTION, SOLUTION INTRAMUSCULAR; INTRAVENOUS; SUBCUTANEOUS at 01:45

## 2024-06-30 RX ADMIN — ACETAMINOPHEN 975 MG: 325 TABLET, FILM COATED ORAL at 11:07

## 2024-06-30 RX ADMIN — HYDROMORPHONE HYDROCHLORIDE 0.3 MG: 1 INJECTION, SOLUTION INTRAMUSCULAR; INTRAVENOUS; SUBCUTANEOUS at 12:12

## 2024-06-30 RX ADMIN — SODIUM PHOSPHATE, MONOBASIC, MONOHYDRATE AND SODIUM PHOSPHATE, DIBASIC, ANHYDROUS 15 MMOL: 142; 276 INJECTION, SOLUTION INTRAVENOUS at 14:14

## 2024-06-30 RX ADMIN — ACETAMINOPHEN 975 MG: 325 TABLET, FILM COATED ORAL at 18:52

## 2024-06-30 RX ADMIN — POTASSIUM CHLORIDE, DEXTROSE MONOHYDRATE AND SODIUM CHLORIDE: 150; 5; 450 INJECTION, SOLUTION INTRAVENOUS at 21:48

## 2024-06-30 RX ADMIN — POTASSIUM CHLORIDE, DEXTROSE MONOHYDRATE AND SODIUM CHLORIDE: 150; 5; 450 INJECTION, SOLUTION INTRAVENOUS at 10:03

## 2024-06-30 ASSESSMENT — ACTIVITIES OF DAILY LIVING (ADL)
ADLS_ACUITY_SCORE: 30
ADLS_ACUITY_SCORE: 27
ADLS_ACUITY_SCORE: 22
ADLS_ACUITY_SCORE: 22
ADLS_ACUITY_SCORE: 27
ADLS_ACUITY_SCORE: 22
ADLS_ACUITY_SCORE: 29
ADLS_ACUITY_SCORE: 27
ADLS_ACUITY_SCORE: 22
ADLS_ACUITY_SCORE: 22
ADLS_ACUITY_SCORE: 27
ADLS_ACUITY_SCORE: 22
ADLS_ACUITY_SCORE: 27
ADLS_ACUITY_SCORE: 22
ADLS_ACUITY_SCORE: 30
ADLS_ACUITY_SCORE: 22
ADLS_ACUITY_SCORE: 28
ADLS_ACUITY_SCORE: 27
ADLS_ACUITY_SCORE: 22
ADLS_ACUITY_SCORE: 27

## 2024-06-30 NOTE — PLAN OF CARE
Goal Outcome Evaluation:      Plan of Care Reviewed With: patient, spouse    Overall Patient Progress: improvingOverall Patient Progress: improving    Outcome Evaluation: NG clamped at 0900. tolerating well. ivf running at 125, solomon dc'd at 0900    VS-stable , afebrile,   Lung Sounds-clear, no cough, on room air. Capnography removed.   O2-on room air.   GI-  no audible bs, +flatus per pt. Per pt had 3 loose brown bm's since surgery, NPO except ice chips. NG clamped at 0900. 400 out this shift prior to clamping. Pt reports he had 3 glasses of ice chips prior to clamping. Did not have any when clamped. Opened to suction from [id number] to check residual,. 30cc residual.. dc'd ng tube at 1400. Started on clear liquids after 1430. 5 stools total this shift. Loose, brown, small amounts each time.   -solomon removed at 0900. Had 325 in the bag. Due to void. Voided x 2 afterwards 175 and 300 voids.,   IVF-at 125.   Dressings-steristrips on lap sites c/d/I. No drainage. Ice to abdomen.   CMS-denies numbness and tingling. +pp. No swelling noted. Strong dorsi/planter flexion.   Drain-none.   Activity-up independently. Tolerating walk in the hallwayx 2 tolerating great. Denies feeling dizzy. Fatigued after activity.   Pain-some cramping in abdomen after walking. Minimal use of iv dilaudid. Used x 1 with some compazine per his request.   D/C Plan-home when able with family. Per surgery 1-2 days depending on diet, pain control.  Hoping to remove NG after clamping trial and start clear liquids.   Wife here and supportive.     Problem: Adult Inpatient Plan of Care  Goal: Plan of Care Review  Description: The Plan of Care Review/Shift note should be completed every shift.  The Outcome Evaluation is a brief statement about your assessment that the patient is improving, declining, or no change.  This information will be displayed automatically on your shift  note.  Outcome: Progressing  Flowsheets (Taken 6/30/2024 1004)  Outcome  "Evaluation: NG clamped at 0900. tolerating well. ivf running at 125, solomon dc'd at 0900  Plan of Care Reviewed With:   patient   spouse  Overall Patient Progress: improving  Goal: Patient-Specific Goal (Individualized)  Description: You can add care plan individualizations to a care plan. Examples of Individualization might be:  \"Parent requests to be called daily at 9am for status\", \"I have a hard time hearing out of my right ear\", or \"Do not touch me to wake me up as it startles  me\".  Outcome: Progressing  Goal: Absence of Hospital-Acquired Illness or Injury  Outcome: Progressing  Intervention: Identify and Manage Fall Risk  Recent Flowsheet Documentation  Taken 6/30/2024 0900 by Brandie Soto RN  Safety Promotion/Fall Prevention:   activity supervised   assistive device/personal items within reach   increased rounding and observation   clutter free environment maintained  Intervention: Prevent Skin Injury  Recent Flowsheet Documentation  Taken 6/30/2024 0900 by Brandie Soto RN  Body Position: position maintained  Skin Protection: protective footwear used  Device Skin Pressure Protection:   positioning supports utilized   tubing/devices free from skin contact  Intervention: Prevent and Manage VTE (Venous Thromboembolism) Risk  Recent Flowsheet Documentation  Taken 6/30/2024 0900 by Brandie Soto RN  VTE Prevention/Management: compression stockings off  Intervention: Prevent Infection  Recent Flowsheet Documentation  Taken 6/30/2024 0900 by Brandie Soto RN  Infection Prevention:   environmental surveillance performed   equipment surfaces disinfected   hand hygiene promoted  Goal: Optimal Comfort and Wellbeing  Outcome: Progressing  Intervention: Monitor Pain and Promote Comfort  Recent Flowsheet Documentation  Taken 6/30/2024 0900 by Brandie Soto RN  Pain Management Interventions: declines  Goal: Readiness for Transition of Care  Outcome: Progressing     "

## 2024-06-30 NOTE — CARE PLAN
PRIMARY DIAGNOSIS: BILIARY COLIC/UNCOMPLICATED EARLY ACUTE CHOLECYSTITIS  OUTPATIENT/OBSERVATION GOALS TO BE MET BEFORE DISCHARGE:    1. Pain status: Pain free.  2. Stable vital signs and labs (if performed) at disposition: Yes  3. Tolerating adequate PO diet: No  4. Successful cholecystectomy or clear follow up plan with General Surgery team if immediate surgery not performed Yes  5. ADLs back to baseline?  Yes  6. Activity and level of assistance: Up with standby assistance.  7. Barriers to discharge noted: None     Discharge Planner Nurse   Safe discharge environment identified: Yes  Barriers to discharge: No       Entered by: Chayito Renae RN 06/29/2024 8:00 PM     Please review provider order for any additional goals.   Nurse to notify provider when observation goals have been met and patient is ready for discharge.

## 2024-06-30 NOTE — PROGRESS NOTES
[facility]  General Surgery  Daily Post-Op Note       Assessment and Plan:   Raji Barker is a 34 year old male S/P Procedure(s):  LAPAROSCOPY, DIAGNOSTIC  LYSIS, ADHESIONS, ROBOT-ASSISTED, 1 Day Post-Op  Ileus resolving, +bowel function  ?brownish NG output: recheck Hb, on protonix    Hypophos to 1.8: replacement protocol ordered  Pain: tylenol, toradol, oxycodone, dilaudid  Bowel: NG clamped this morning, remove after 4 hours if residual <200ml and asymptomatic.   Diet: start clears after NG removal. Clears for today, advance tomorrow if tolerating  IVFs: D5 0.45%+Kcl 125ml/hr until tolerating diet  Activity: ad michael  Antibiotics: n/a  DVT prophylaxis: hold lovenox given ?bloody NG output, SCDs   Dispo: 1-2 more days pending diet tolerance  Discussed with Dr Cortes, surgery will take over as primary as pt has no medical problems        Interval History:   Feeling much better, minimal pain, abd sore, severe crampy pains improved. Passing flatus and +Bms. Would like solomon removed and hoping for NG out today. Dark appearing NG output. Discussed surgical findings.           Physical Exam:   Temp: 98.6  F (37  C) Temp src: Temporal BP: 109/64 Pulse: 89   Resp: 20 SpO2: 93 % O2 Device: None (Room air) Oxygen Delivery: 11 LPM    I/O last 3 completed shifts:  In: 4000 [I.V.:4000]  Out: 2800 [Urine:650; Emesis/NG output:2150]      Constitutional: healthy, alert, and no distress   Respiratory: breathing comfortably on RA  Abdomen: Abdomen soft, non-tender. Mildly distended. Incisions c/d/I, left upper quadrant port site old bloody drainage on steri strips  NG: dark brownish output in cannister    I/O:    Intake/Output Summary (Last 24 hours) at 6/30/2024 0853  Last data filed at 6/30/2024 0530  Gross per 24 hour   Intake 4000 ml   Output 2800 ml   Net 1200 ml       Data   Recent Labs   Lab 06/29/24  0704 06/28/24  0803 06/28/24  0121   WBC  --  7.6 13.6*   HGB  --  15.2 15.8   MCV  --  89 88   PLT  --  209 214     138 138   POTASSIUM 4.2 4.4 3.8   CHLORIDE 106 106 102   CO2 22 21* 24   BUN 23.8* 18.5 20.8*   CR 0.84 0.74 0.99   ANIONGAP 13 11 12   QUINTON 9.0 8.9 9.7   * 101* 120*   ALBUMIN  --   --  4.7   PROTTOTAL  --   --  7.7   BILITOTAL  --   --  0.4   ALKPHOS  --   --  58   ALT  --   --  51   AST  --   --  45         Birgit Ceballos MD

## 2024-06-30 NOTE — PLAN OF CARE
"Pt is  more alert but still drowsy. On room air. Pt still connected to capno. Pain of a 4-5 noted. Dilaudid 0.3mg was given x2. D5 with KCL continuous at 125ml/hr. Walked to the bathroom with assist x1. Had 2 small loose bowel movements. Muniz cath in place. Incisional sites clean, dry and intact. Ice pack reapplied. Npo. NG tube intact- output of 1000ml.     Problem: Adult Inpatient Plan of Care  Goal: Plan of Care Review  Description: The Plan of Care Review/Shift note should be completed every shift.  The Outcome Evaluation is a brief statement about your assessment that the patient is improving, declining, or no change.  This information will be displayed automatically on your shift  note.  Outcome: Progressing  Flowsheets (Taken 6/30/2024 0435)  Outcome Evaluation: Pt is more alert. NG tube in place. Muniz in place. Muniz cares done. Dilaudud 0.3mg given for pain.  Plan of Care Reviewed With: patient  Overall Patient Progress: improving  Goal: Patient-Specific Goal (Individualized)  Description: You can add care plan individualizations to a care plan. Examples of Individualization might be:  \"Parent requests to be called daily at 9am for status\", \"I have a hard time hearing out of my right ear\", or \"Do not touch me to wake me up as it startles  me\".  Outcome: Progressing  Goal: Absence of Hospital-Acquired Illness or Injury  Outcome: Progressing  Intervention: Identify and Manage Fall Risk  Recent Flowsheet Documentation  Taken 6/30/2024 0145 by Katy Hicks, RN  Safety Promotion/Fall Prevention:   safety round/check completed   room organization consistent   room near nurse's station   mobility aid in reach   lighting adjusted   nonskid shoes/slippers when out of bed  Taken 6/29/2024 2224 by Katy Hicks, RN  Safety Promotion/Fall Prevention:   safety round/check completed   room organization consistent   room near nurse's station   mobility aid in reach   lighting adjusted   nonskid shoes/slippers when " out of bed  Intervention: Prevent Skin Injury  Recent Flowsheet Documentation  Taken 6/30/2024 0145 by Katy Hicks RN  Body Position: position changed independently  Taken 6/29/2024 2224 by Katy Hicks RN  Body Position: position changed independently  Intervention: Prevent Infection  Recent Flowsheet Documentation  Taken 6/30/2024 0145 by Katy Hicks RN  Infection Prevention:   single patient room provided   rest/sleep promoted   hand hygiene promoted  Taken 6/29/2024 2224 by Katy Hicks RN  Infection Prevention:   single patient room provided   rest/sleep promoted   hand hygiene promoted  Goal: Optimal Comfort and Wellbeing  Outcome: Progressing  Goal: Readiness for Transition of Care  Outcome: Progressing     Problem: Pain Acute  Goal: Optimal Pain Control and Function  Outcome: Progressing  Intervention: Prevent or Manage Pain  Recent Flowsheet Documentation  Taken 6/30/2024 0145 by Katy Hicks RN  Medication Review/Management: medications reviewed  Taken 6/29/2024 2224 by Katy Hicks RN  Medication Review/Management: medications reviewed     Problem: Intestinal Obstruction  Goal: Optimal Bowel Function  Outcome: Progressing  Intervention: Promote Bowel Function  Recent Flowsheet Documentation  Taken 6/30/2024 0145 by Katy Hicks RN  Body Position: position changed independently  Head of Bed (HOB) Positioning: HOB at 20-30 degrees  Positioning/Transfer Devices:   pillows   in use  Taken 6/29/2024 2224 by Katy Hicks RN  Body Position: position changed independently  Head of Bed (HOB) Positioning: HOB at 20-30 degrees  Positioning/Transfer Devices:   pillows   in use  Goal: Fluid and Electrolyte Balance  Outcome: Progressing  Intervention: Monitor and Manage Hypovolemia  Recent Flowsheet Documentation  Taken 6/30/2024 0145 by Katy Hicks RN  Fluid/Electrolyte Management: fluids provided  Taken 6/29/2024 2224 by Katy Hicks RN  Fluid/Electrolyte Management: fluids  provided  Goal: Absence of Infection Signs and Symptoms  Outcome: Progressing  Goal: Optimize Nutrition Status  Outcome: Progressing  Goal: Optimal Pain Control and Function  Outcome: Progressing     Problem: Infection  Goal: Absence of Infection Signs and Symptoms  Outcome: Progressing     Problem: Surgery Nonspecified  Goal: Absence of Bleeding  Outcome: Progressing  Intervention: Monitor and Manage Bleeding  Recent Flowsheet Documentation  Taken 6/30/2024 0145 by Kayt Hicks RN  Bleeding Management: dressing monitored  Taken 6/29/2024 2224 by Katy Hicks RN  Bleeding Management: dressing monitored  Goal: Effective Bowel Elimination  Outcome: Progressing  Goal: Fluid and Electrolyte Balance  Outcome: Progressing  Intervention: Monitor and Manage Fluid and Electrolyte Balance  Recent Flowsheet Documentation  Taken 6/30/2024 0145 by Katy Hicks RN  Fluid/Electrolyte Management: fluids provided  Taken 6/29/2024 2224 by Katy Hicks RN  Fluid/Electrolyte Management: fluids provided  Goal: Blood Glucose Level Within Targeted Range  Outcome: Progressing  Goal: Absence of Infection Signs and Symptoms  Outcome: Progressing  Goal: Anesthesia/Sedation Recovery  Outcome: Progressing  Intervention: Optimize Anesthesia Recovery  Recent Flowsheet Documentation  Taken 6/30/2024 0145 by Katy Hicks RN  Safety Promotion/Fall Prevention:   safety round/check completed   room organization consistent   room near nurse's station   mobility aid in reach   lighting adjusted   nonskid shoes/slippers when out of bed  Taken 6/29/2024 2224 by Katy Hicks RN  Safety Promotion/Fall Prevention:   safety round/check completed   room organization consistent   room near nurse's station   mobility aid in reach   lighting adjusted   nonskid shoes/slippers when out of bed  Goal: Optimal Pain Control and Function  Outcome: Progressing  Goal: Nausea and Vomiting Relief  Outcome: Progressing  Goal: Effective Urinary  "Elimination  Outcome: Progressing  Intervention: Monitor and Manage Urinary Retention  Recent Flowsheet Documentation  Taken 6/30/2024 0145 by Katy Hicks, RN  Urinary Elimination Promotion: catheter patency maintained  Taken 6/29/2024 2224 by Katy Hicks RN  Urinary Elimination Promotion: catheter patency maintained  Goal: Effective Oxygenation and Ventilation  Outcome: Progressing  Intervention: Optimize Oxygenation and Ventilation  Recent Flowsheet Documentation  Taken 6/30/2024 0145 by Katy Hicks, RN  Head of Bed (HOB) Positioning: HOB at 20-30 degrees  Taken 6/29/2024 2224 by Katy Hicks RN  Head of Bed (HOB) Positioning: HOB at 20-30 degrees     Problem: Adult Inpatient Plan of Care  Goal: Plan of Care Review  Description: The Plan of Care Review/Shift note should be completed every shift.  The Outcome Evaluation is a brief statement about your assessment that the patient is improving, declining, or no change.  This information will be displayed automatically on your shift  note.  Outcome: Progressing  Flowsheets (Taken 6/30/2024 0435)  Outcome Evaluation: Pt is more alert. NG tube in place. Muniz in place. Muniz cares done. Dilaudud 0.3mg given for pain.  Plan of Care Reviewed With: patient  Overall Patient Progress: improving  Goal: Patient-Specific Goal (Individualized)  Description: You can add care plan individualizations to a care plan. Examples of Individualization might be:  \"Parent requests to be called daily at 9am for status\", \"I have a hard time hearing out of my right ear\", or \"Do not touch me to wake me up as it startles  me\".  Outcome: Progressing  Goal: Absence of Hospital-Acquired Illness or Injury  Outcome: Progressing  Intervention: Identify and Manage Fall Risk  Recent Flowsheet Documentation  Taken 6/30/2024 0145 by Katy Hicks, RN  Safety Promotion/Fall Prevention:   safety round/check completed   room organization consistent   room near nurse's station   mobility aid in " reach   lighting adjusted   nonskid shoes/slippers when out of bed  Taken 6/29/2024 2224 by Katy Hicks RN  Safety Promotion/Fall Prevention:   safety round/check completed   room organization consistent   room near nurse's station   mobility aid in reach   lighting adjusted   nonskid shoes/slippers when out of bed  Intervention: Prevent Skin Injury  Recent Flowsheet Documentation  Taken 6/30/2024 0145 by Katy Hicks RN  Body Position: position changed independently  Taken 6/29/2024 2224 by Katy Hicks RN  Body Position: position changed independently  Intervention: Prevent Infection  Recent Flowsheet Documentation  Taken 6/30/2024 0145 by Katy Hicks RN  Infection Prevention:   single patient room provided   rest/sleep promoted   hand hygiene promoted  Taken 6/29/2024 2224 by Katy Hicks RN  Infection Prevention:   single patient room provided   rest/sleep promoted   hand hygiene promoted  Goal: Optimal Comfort and Wellbeing  Outcome: Progressing  Goal: Readiness for Transition of Care  Outcome: Progressing     Problem: Pain Acute  Goal: Optimal Pain Control and Function  Outcome: Progressing  Intervention: Prevent or Manage Pain  Recent Flowsheet Documentation  Taken 6/30/2024 0145 by Katy Hicks RN  Medication Review/Management: medications reviewed  Taken 6/29/2024 2224 by Katy Hicks RN  Medication Review/Management: medications reviewed     Problem: Intestinal Obstruction  Goal: Optimal Bowel Function  Outcome: Progressing  Intervention: Promote Bowel Function  Recent Flowsheet Documentation  Taken 6/30/2024 0145 by Katy Hicks RN  Body Position: position changed independently  Head of Bed (HOB) Positioning: HOB at 20-30 degrees  Positioning/Transfer Devices:   pillows   in use  Taken 6/29/2024 2224 by Katy Hicks RN  Body Position: position changed independently  Head of Bed (HOB) Positioning: HOB at 20-30 degrees  Positioning/Transfer Devices:   pillows   in use  Goal:  Fluid and Electrolyte Balance  Outcome: Progressing  Intervention: Monitor and Manage Hypovolemia  Recent Flowsheet Documentation  Taken 6/30/2024 0145 by Katy Hicks, RN  Fluid/Electrolyte Management: fluids provided  Taken 6/29/2024 2224 by Katy Hicks, RN  Fluid/Electrolyte Management: fluids provided  Goal: Absence of Infection Signs and Symptoms  Outcome: Progressing  Goal: Optimize Nutrition Status  Outcome: Progressing  Goal: Optimal Pain Control and Function  Outcome: Progressing     Problem: Infection  Goal: Absence of Infection Signs and Symptoms  Outcome: Progressing   Goal Outcome Evaluation:      Plan of Care Reviewed With: patient    Overall Patient Progress: improvingOverall Patient Progress: improving    Outcome Evaluation: Pt is more alert. NG tube in place. Muniz in place. Muniz cares done. Dilaudud 0.3mg given for pain.

## 2024-06-30 NOTE — PROVIDER NOTIFICATION
Message sent to Dr. Ceballos to clarify post-op orders.  Continue NG to LIS?  Should diet remain NPO but ok for ice chips?  Response received at 2076 to continue NG to LIS and ok for NPO with ice chips.

## 2024-06-30 NOTE — PROGRESS NOTES
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 Surgery was consulted and gastrograffin challenge completed 6/29/2024 showed partial advancement to colon. Given patient ongoing significant pain with clamping NG tube, and continued partial SBO, surgery was offered and patient taken to OR on 6/29/2024. Discussion with surgeon, Dr. Ceballos, afterwards was that patient would become primary surgical patient as he has a lack of other underlying medical concerns. Medicine team appreciates surgery's assistance and will therefore sign off at this time. If there are any concerns or questions, please reconsult medical team at that time.    Patient was not seen by me today, and this should be considered a non-billable note.    Cameron Cortes MD  Hospitalist

## 2024-07-01 VITALS
BODY MASS INDEX: 29.9 KG/M2 | TEMPERATURE: 97.7 F | WEIGHT: 226.63 LBS | OXYGEN SATURATION: 97 % | HEART RATE: 63 BPM | DIASTOLIC BLOOD PRESSURE: 67 MMHG | SYSTOLIC BLOOD PRESSURE: 109 MMHG | RESPIRATION RATE: 18 BRPM

## 2024-07-01 LAB — PHOSPHATE SERPL-MCNC: 2.4 MG/DL (ref 2.5–4.5)

## 2024-07-01 PROCEDURE — 250N000011 HC RX IP 250 OP 636: Performed by: SURGERY

## 2024-07-01 PROCEDURE — 250N000013 HC RX MED GY IP 250 OP 250 PS 637: Performed by: SURGERY

## 2024-07-01 PROCEDURE — 36415 COLL VENOUS BLD VENIPUNCTURE: CPT | Performed by: STUDENT IN AN ORGANIZED HEALTH CARE EDUCATION/TRAINING PROGRAM

## 2024-07-01 PROCEDURE — 84100 ASSAY OF PHOSPHORUS: CPT | Performed by: STUDENT IN AN ORGANIZED HEALTH CARE EDUCATION/TRAINING PROGRAM

## 2024-07-01 RX ORDER — OXYCODONE HYDROCHLORIDE 5 MG/1
5 TABLET ORAL EVERY 6 HOURS PRN
Qty: 8 TABLET | Refills: 0 | Status: SHIPPED | OUTPATIENT
Start: 2024-07-01

## 2024-07-01 RX ORDER — IBUPROFEN 600 MG/1
600 TABLET, FILM COATED ORAL EVERY 6 HOURS PRN
Status: DISCONTINUED | OUTPATIENT
Start: 2024-07-01 | End: 2024-07-01 | Stop reason: HOSPADM

## 2024-07-01 RX ADMIN — ACETAMINOPHEN 975 MG: 325 TABLET, FILM COATED ORAL at 03:17

## 2024-07-01 RX ADMIN — POTASSIUM & SODIUM PHOSPHATES POWDER PACK 280-160-250 MG 1 PACKET: 280-160-250 PACK at 11:10

## 2024-07-01 RX ADMIN — OXYCODONE HYDROCHLORIDE 5 MG: 5 TABLET ORAL at 00:51

## 2024-07-01 RX ADMIN — OXYCODONE HYDROCHLORIDE 5 MG: 5 TABLET ORAL at 05:16

## 2024-07-01 RX ADMIN — POTASSIUM & SODIUM PHOSPHATES POWDER PACK 280-160-250 MG 1 PACKET: 280-160-250 PACK at 16:51

## 2024-07-01 RX ADMIN — ACETAMINOPHEN 975 MG: 325 TABLET, FILM COATED ORAL at 11:02

## 2024-07-01 RX ADMIN — KETOROLAC TROMETHAMINE 30 MG: 30 INJECTION, SOLUTION INTRAMUSCULAR; INTRAVENOUS at 07:59

## 2024-07-01 RX ADMIN — PANTOPRAZOLE SODIUM 40 MG: 40 INJECTION, POWDER, FOR SOLUTION INTRAVENOUS at 07:57

## 2024-07-01 ASSESSMENT — ACTIVITIES OF DAILY LIVING (ADL)
ADLS_ACUITY_SCORE: 22
ADLS_ACUITY_SCORE: 23
ADLS_ACUITY_SCORE: 23
ADLS_ACUITY_SCORE: 22
ADLS_ACUITY_SCORE: 22
ADLS_ACUITY_SCORE: 23
ADLS_ACUITY_SCORE: 22
ADLS_ACUITY_SCORE: 23
ADLS_ACUITY_SCORE: 22
ADLS_ACUITY_SCORE: 23
ADLS_ACUITY_SCORE: 23
ADLS_ACUITY_SCORE: 22
ADLS_ACUITY_SCORE: 22
ADLS_ACUITY_SCORE: 23
ADLS_ACUITY_SCORE: 22
ADLS_ACUITY_SCORE: 23
ADLS_ACUITY_SCORE: 22

## 2024-07-01 NOTE — PLAN OF CARE
"Goal Outcome Evaluation:  Vital signs stable.  Lungs clear, encouraged inspirometer use.  Tolerated clear liquids, passing flatus, no nausea. Steri strips to  lap sites on abdomen intact, small amount  of dried drainage.  Pt ambulated with sba of 1. Voiding.  Phosphorous  level recheck .  Pain controlled with oxycodone, tylenol and ice pack appl  Problem: Adult Inpatient Plan of Care  Goal: Plan of Care Review  Description: The Plan of Care Review/Shift note should be completed every shift.  The Outcome Evaluation is a brief statement about your assessment that the patient is improving, declining, or no change.  This information will be displayed automatically on your shift  note.  Outcome: Progressing  Flowsheets (Taken 6/30/2024 2101)  Outcome Evaluation: Ng was removed, diet advanced to clear liquids, pt tolerated same. Ambulates in hallway. Passing flatus, pain controlled.  Plan of Care Reviewed With: patient  Overall Patient Progress: improving  Goal: Patient-Specific Goal (Individualized)  Description: You can add care plan individualizations to a care plan. Examples of Individualization might be:  \"Parent requests to be called daily at 9am for status\", \"I have a hard time hearing out of my right ear\", or \"Do not touch me to wake me up as it startles  me\".  Outcome: Progressing  Goal: Absence of Hospital-Acquired Illness or Injury  Outcome: Progressing  Intervention: Identify and Manage Fall Risk  Recent Flowsheet Documentation  Taken 6/30/2024 1700 by Rachael Zhou, RN  Safety Promotion/Fall Prevention:   activity supervised   assistive device/personal items within reach   lighting adjusted   nonskid shoes/slippers when out of bed  Intervention: Prevent Skin Injury  Recent Flowsheet Documentation  Taken 6/30/2024 1700 by Rachael Zhou RN  Body Position: position changed independently  Skin Protection: adhesive use limited  Device Skin Pressure Protection: positioning supports utilized  Intervention: " Prevent and Manage VTE (Venous Thromboembolism) Risk  Recent Flowsheet Documentation  Taken 6/30/2024 1700 by Rachael Zhou RN  VTE Prevention/Management: SCDs on (sequential compression devices)  Intervention: Prevent Infection  Recent Flowsheet Documentation  Taken 6/30/2024 1700 by Rachael Zhou RN  Infection Prevention:   rest/sleep promoted   single patient room provided   hand hygiene promoted  Goal: Optimal Comfort and Wellbeing  Outcome: Progressing  Intervention: Monitor Pain and Promote Comfort  Recent Flowsheet Documentation  Taken 6/30/2024 1953 by Rachael Zhou RN  Pain Management Interventions: declines  Taken 6/30/2024 1937 by Rachael Zhou RN  Pain Management Interventions: declines  Taken 6/30/2024 1852 by Rachael Zhou RN  Pain Management Interventions: medication (see MAR)  Taken 6/30/2024 1545 by Rachael Zhou RN  Pain Management Interventions:   ambulation/increased activity   cold applied  Goal: Readiness for Transition of Care  Outcome: Progressing     Problem: Pain Acute  Goal: Optimal Pain Control and Function  Outcome: Progressing  Intervention: Develop Pain Management Plan  Recent Flowsheet Documentation  Taken 6/30/2024 1953 by Rachale Zhou RN  Pain Management Interventions: declines  Taken 6/30/2024 1937 by Rachael Zhou RN  Pain Management Interventions: declines  Taken 6/30/2024 1852 by Rachael Zhou RN  Pain Management Interventions: medication (see MAR)  Taken 6/30/2024 1545 by Rachael Zhou RN  Pain Management Interventions:   ambulation/increased activity   cold applied  Intervention: Prevent or Manage Pain  Recent Flowsheet Documentation  Taken 6/30/2024 1700 by Rachael Zhou RN  Sensory Stimulation Regulation:   care clustered   lighting decreased   quiet environment promoted  Bowel Elimination Promotion:   ambulation promoted   adequate fluid intake promoted  Medication Review/Management: medications reviewed  Intervention:  Optimize Psychosocial Wellbeing  Recent Flowsheet Documentation  Taken 6/30/2024 1700 by Rachael Zhou RN  Supportive Measures:   active listening utilized   relaxation techniques promoted     Problem: Intestinal Obstruction  Goal: Optimal Bowel Function  Outcome: Progressing  Intervention: Promote Bowel Function  Recent Flowsheet Documentation  Taken 6/30/2024 1700 by Rachael Zhou RN  Body Position: position changed independently  Head of Bed (HOB) Positioning: HOB at 45 degrees  Positioning/Transfer Devices:   pillows   in use  Goal: Fluid and Electrolyte Balance  Outcome: Progressing  Intervention: Monitor and Manage Hypovolemia  Recent Flowsheet Documentation  Taken 6/30/2024 1700 by Rachael Zhou RN  Fluid/Electrolyte Management: fluids adjusted  Goal: Absence of Infection Signs and Symptoms  Outcome: Progressing  Intervention: Prevent or Manage Infection  Recent Flowsheet Documentation  Taken 6/30/2024 1700 by Rachael Zhou RN  Infection Management: aseptic technique maintained  Goal: Optimize Nutrition Status  Outcome: Progressing  Goal: Optimal Pain Control and Function  Outcome: Progressing  Intervention: Prevent or Manage Pain  Recent Flowsheet Documentation  Taken 6/30/2024 1953 by Rachael Zhou RN  Pain Management Interventions: declines  Taken 6/30/2024 1937 by Rachael Zhou RN  Pain Management Interventions: declines  Taken 6/30/2024 1852 by Rachael Zhou RN  Pain Management Interventions: medication (see MAR)  Taken 6/30/2024 1545 by Rachael Zhou RN  Pain Management Interventions:   ambulation/increased activity   cold applied     Problem: Infection  Goal: Absence of Infection Signs and Symptoms  Outcome: Progressing  Intervention: Prevent or Manage Infection  Recent Flowsheet Documentation  Taken 6/30/2024 1700 by Rachael Zhou RN  Infection Management: aseptic technique maintained     Problem: Surgery Nonspecified  Goal: Absence of Bleeding  Outcome:  Progressing  Intervention: Monitor and Manage Bleeding  Recent Flowsheet Documentation  Taken 6/30/2024 1700 by Rachael Zhou RN  Bleeding Management: dressing monitored  Goal: Effective Bowel Elimination  Outcome: Progressing  Intervention: Enhance Bowel Motility and Elimination  Recent Flowsheet Documentation  Taken 6/30/2024 1700 by Rachael Zhou RN  Bowel Motility Enhancement:   ambulation promoted   fluid intake encouraged  Bowel Elimination Management: relaxation techniques promoted  Goal: Fluid and Electrolyte Balance  Outcome: Progressing  Intervention: Monitor and Manage Fluid and Electrolyte Balance  Recent Flowsheet Documentation  Taken 6/30/2024 1700 by Rachael Zhou RN  Fluid/Electrolyte Management: fluids adjusted  Goal: Blood Glucose Level Within Targeted Range  Outcome: Progressing  Goal: Absence of Infection Signs and Symptoms  Outcome: Progressing  Intervention: Prevent or Manage Infection  Recent Flowsheet Documentation  Taken 6/30/2024 1700 by Rachael Zhou RN  Infection Management: aseptic technique maintained  Goal: Anesthesia/Sedation Recovery  Outcome: Progressing  Intervention: Optimize Anesthesia Recovery  Recent Flowsheet Documentation  Taken 6/30/2024 1700 by Rachael Zhou RN  Safety Promotion/Fall Prevention:   activity supervised   assistive device/personal items within reach   lighting adjusted   nonskid shoes/slippers when out of bed  Administration (IS): instruction provided, follow-up  Level Incentive Spirometer (mL): 1200  Number of Repetitions (IS): 4  Patient Tolerance (IS): fair  Goal: Optimal Pain Control and Function  Outcome: Progressing  Intervention: Prevent or Manage Pain  Recent Flowsheet Documentation  Taken 6/30/2024 1953 by Rachael Zhou RN  Pain Management Interventions: declines  Taken 6/30/2024 1937 by Rachael Zhou RN  Pain Management Interventions: declines  Taken 6/30/2024 1852 by Rachael Zhou RN  Pain Management  "Interventions: medication (see MAR)  Taken 6/30/2024 1545 by Rachael Zhou RN  Pain Management Interventions:   ambulation/increased activity   cold applied  Goal: Nausea and Vomiting Relief  Outcome: Progressing  Goal: Effective Urinary Elimination  Outcome: Progressing  Intervention: Monitor and Manage Urinary Retention  Recent Flowsheet Documentation  Taken 6/30/2024 1700 by Rachael Zhou RN  Urinary Elimination Promotion:   voiding relaxation promoted   toileting offered  Goal: Effective Oxygenation and Ventilation  Outcome: Progressing  Intervention: Optimize Oxygenation and Ventilation  Recent Flowsheet Documentation  Taken 6/30/2024 1700 by Rachael Zhou RN  Airway/Ventilation Management: pulmonary hygiene promoted  Head of Bed (HOB) Positioning: HOB at 45 degrees     Problem: Adult Inpatient Plan of Care  Goal: Plan of Care Review  Description: The Plan of Care Review/Shift note should be completed every shift.  The Outcome Evaluation is a brief statement about your assessment that the patient is improving, declining, or no change.  This information will be displayed automatically on your shift  note.  Outcome: Progressing  Flowsheets (Taken 6/30/2024 2101)  Outcome Evaluation: Ng was removed, diet advanced to clear liquids, pt tolerated same. Ambulates in hallway. Passing flatus, pain controlled.  Plan of Care Reviewed With: patient  Overall Patient Progress: improving  Goal: Patient-Specific Goal (Individualized)  Description: You can add care plan individualizations to a care plan. Examples of Individualization might be:  \"Parent requests to be called daily at 9am for status\", \"I have a hard time hearing out of my right ear\", or \"Do not touch me to wake me up as it startles  me\".  Outcome: Progressing  Goal: Absence of Hospital-Acquired Illness or Injury  Outcome: Progressing  Intervention: Identify and Manage Fall Risk  Recent Flowsheet Documentation  Taken 6/30/2024 1700 by Abelardo" Rachael R, RN  Safety Promotion/Fall Prevention:   activity supervised   assistive device/personal items within reach   lighting adjusted   nonskid shoes/slippers when out of bed  Intervention: Prevent Skin Injury  Recent Flowsheet Documentation  Taken 6/30/2024 1700 by Rachael Zhou RN  Body Position: position changed independently  Skin Protection: adhesive use limited  Device Skin Pressure Protection: positioning supports utilized  Intervention: Prevent and Manage VTE (Venous Thromboembolism) Risk  Recent Flowsheet Documentation  Taken 6/30/2024 1700 by Rachael Zhou RN  VTE Prevention/Management: SCDs on (sequential compression devices)  Intervention: Prevent Infection  Recent Flowsheet Documentation  Taken 6/30/2024 1700 by Rachael Zhou RN  Infection Prevention:   rest/sleep promoted   single patient room provided   hand hygiene promoted  Goal: Optimal Comfort and Wellbeing  Outcome: Progressing  Intervention: Monitor Pain and Promote Comfort  Recent Flowsheet Documentation  Taken 6/30/2024 1953 by Rachael Zhou RN  Pain Management Interventions: declines  Taken 6/30/2024 1937 by Rachael Zhou RN  Pain Management Interventions: declines  Taken 6/30/2024 1852 by Rachael Zhou RN  Pain Management Interventions: medication (see MAR)  Taken 6/30/2024 1545 by Rachael Zhou RN  Pain Management Interventions:   ambulation/increased activity   cold applied  Goal: Readiness for Transition of Care  Outcome: Progressing     Problem: Pain Acute  Goal: Optimal Pain Control and Function  Outcome: Progressing  Intervention: Develop Pain Management Plan  Recent Flowsheet Documentation  Taken 6/30/2024 1953 by Rachael Zhou RN  Pain Management Interventions: declines  Taken 6/30/2024 1937 by Rachael Zhou RN  Pain Management Interventions: declines  Taken 6/30/2024 1852 by Rachael Zhou RN  Pain Management Interventions: medication (see MAR)  Taken 6/30/2024 1545 by Abelardo  Rachael ALVAREZ RN  Pain Management Interventions:   ambulation/increased activity   cold applied  Intervention: Prevent or Manage Pain  Recent Flowsheet Documentation  Taken 6/30/2024 1700 by Rachael Zhou RN  Sensory Stimulation Regulation:   care clustered   lighting decreased   quiet environment promoted  Bowel Elimination Promotion:   ambulation promoted   adequate fluid intake promoted  Medication Review/Management: medications reviewed  Intervention: Optimize Psychosocial Wellbeing  Recent Flowsheet Documentation  Taken 6/30/2024 1700 by Rachael Zhou RN  Supportive Measures:   active listening utilized   relaxation techniques promoted     Problem: Intestinal Obstruction  Goal: Optimal Bowel Function  Outcome: Progressing  Intervention: Promote Bowel Function  Recent Flowsheet Documentation  Taken 6/30/2024 1700 by Rachael Zhou RN  Body Position: position changed independently  Head of Bed (HOB) Positioning: HOB at 45 degrees  Positioning/Transfer Devices:   pillows   in use  Goal: Fluid and Electrolyte Balance  Outcome: Progressing  Intervention: Monitor and Manage Hypovolemia  Recent Flowsheet Documentation  Taken 6/30/2024 1700 by Rachael Zhou RN  Fluid/Electrolyte Management: fluids adjusted  Goal: Absence of Infection Signs and Symptoms  Outcome: Progressing  Intervention: Prevent or Manage Infection  Recent Flowsheet Documentation  Taken 6/30/2024 1700 by Rachael Zhou RN  Infection Management: aseptic technique maintained  Goal: Optimize Nutrition Status  Outcome: Progressing  Goal: Optimal Pain Control and Function  Outcome: Progressing  Intervention: Prevent or Manage Pain  Recent Flowsheet Documentation  Taken 6/30/2024 1953 by Rachael Zhou RN  Pain Management Interventions: declines  Taken 6/30/2024 1937 by Rachael Zhou RN  Pain Management Interventions: declines  Taken 6/30/2024 1852 by Rachael Zhou RN  Pain Management Interventions: medication (see  MAR)  Taken 6/30/2024 1545 by Rachael Zhou, RN  Pain Management Interventions:   ambulation/increased activity   cold applied     Problem: Infection  Goal: Absence of Infection Signs and Symptoms  Outcome: Progressing  Intervention: Prevent or Manage Infection  Recent Flowsheet Documentation  Taken 6/30/2024 1700 by Rachael Zhou, RN  Infection Management: aseptic technique maintained   ication.    Plan of Care Reviewed With: patient    Overall Patient Progress: improvingOverall Patient Progress: improving    Outcome Evaluation: Ng was removed, diet advanced to clear liquids, pt tolerated same. Ambulates in hallway. Passing flatus, pain controlled.

## 2024-07-01 NOTE — PLAN OF CARE
"Goal Outcome Evaluation:      Plan of Care Reviewed With: patient    Overall Patient Progress: improvingOverall Patient Progress: improving    Outcome Evaluation: Pt up walking hallways, clear liquid diet, voiding and has had BM's    Pt is alert and orientated x4, pleasant. Up Independently using IV pole when walking. Clear liquid diet, being tolerated. IVF running at 125ml . 5 Lap sites all look good, 1 with some old dry drainage, steri strips on and intact.  Pt feeling better, Toradol helps with the pain. Changed to low fiber diet, if he is able to tolerate plan is to discharge this evening.      Problem: Adult Inpatient Plan of Care  Goal: Plan of Care Review  Description: The Plan of Care Review/Shift note should be completed every shift.  The Outcome Evaluation is a brief statement about your assessment that the patient is improving, declining, or no change.  This information will be displayed automatically on your shift  note.  Outcome: Progressing  Flowsheets (Taken 7/1/2024 1009)  Outcome Evaluation: Pt up walking hallways, clear liquid diet, voiding and has had BM's  Plan of Care Reviewed With: patient  Overall Patient Progress: improving  Goal: Patient-Specific Goal (Individualized)  Description: You can add care plan individualizations to a care plan. Examples of Individualization might be:  \"Parent requests to be called daily at 9am for status\", \"I have a hard time hearing out of my right ear\", or \"Do not touch me to wake me up as it startles  me\".  Outcome: Progressing  Goal: Absence of Hospital-Acquired Illness or Injury  Outcome: Progressing  Intervention: Prevent Skin Injury  Recent Flowsheet Documentation  Taken 7/1/2024 0800 by Freddy Castillo, RN  Body Position: position changed independently  Goal: Optimal Comfort and Wellbeing  Outcome: Progressing  Goal: Readiness for Transition of Care  Outcome: Progressing     Problem: Pain Acute  Goal: Optimal Pain Control and Function  Outcome: " Progressing     Problem: Intestinal Obstruction  Goal: Optimal Bowel Function  Outcome: Progressing  Intervention: Promote Bowel Function  Recent Flowsheet Documentation  Taken 7/1/2024 0800 by Freddy Castillo RN  Body Position: position changed independently  Goal: Fluid and Electrolyte Balance  Outcome: Progressing  Intervention: Monitor and Manage Hypovolemia  Recent Flowsheet Documentation  Taken 7/1/2024 0800 by Freddy Castillo, RN  Fluid/Electrolyte Management: fluids adjusted  Goal: Absence of Infection Signs and Symptoms  Outcome: Progressing  Goal: Optimize Nutrition Status  Outcome: Progressing  Goal: Optimal Pain Control and Function  Outcome: Progressing     Problem: Infection  Goal: Absence of Infection Signs and Symptoms  Outcome: Progressing     Problem: Surgery Nonspecified  Goal: Absence of Bleeding  Outcome: Progressing  Goal: Effective Bowel Elimination  Outcome: Progressing  Goal: Fluid and Electrolyte Balance  Outcome: Progressing  Intervention: Monitor and Manage Fluid and Electrolyte Balance  Recent Flowsheet Documentation  Taken 7/1/2024 0800 by Freddy Castillo RN  Fluid/Electrolyte Management: fluids adjusted  Goal: Blood Glucose Level Within Targeted Range  Outcome: Progressing  Goal: Absence of Infection Signs and Symptoms  Outcome: Progressing  Goal: Anesthesia/Sedation Recovery  Outcome: Progressing  Goal: Optimal Pain Control and Function  Outcome: Progressing  Goal: Nausea and Vomiting Relief  Outcome: Progressing  Goal: Effective Urinary Elimination  Outcome: Progressing  Goal: Effective Oxygenation and Ventilation  Outcome: Progressing     Problem: Adult Inpatient Plan of Care  Goal: Plan of Care Review  Description: The Plan of Care Review/Shift note should be completed every shift.  The Outcome Evaluation is a brief statement about your assessment that the patient is improving, declining, or no change.  This information will be displayed automatically on your  "shift  note.  Outcome: Progressing  Flowsheets (Taken 7/1/2024 1009)  Outcome Evaluation: Pt up walking hallways, clear liquid diet, voiding and has had 's  Plan of Care Reviewed With: patient  Overall Patient Progress: improving  Goal: Patient-Specific Goal (Individualized)  Description: You can add care plan individualizations to a care plan. Examples of Individualization might be:  \"Parent requests to be called daily at 9am for status\", \"I have a hard time hearing out of my right ear\", or \"Do not touch me to wake me up as it startles  me\".  Outcome: Progressing  Goal: Absence of Hospital-Acquired Illness or Injury  Outcome: Progressing  Intervention: Prevent Skin Injury  Recent Flowsheet Documentation  Taken 7/1/2024 0800 by Freddy Castillo, RN  Body Position: position changed independently  Goal: Optimal Comfort and Wellbeing  Outcome: Progressing  Goal: Readiness for Transition of Care  Outcome: Progressing     Problem: Pain Acute  Goal: Optimal Pain Control and Function  Outcome: Progressing     Problem: Intestinal Obstruction  Goal: Optimal Bowel Function  Outcome: Progressing  Intervention: Promote Bowel Function  Recent Flowsheet Documentation  Taken 7/1/2024 0800 by Freddy Castillo, RN  Body Position: position changed independently  Goal: Fluid and Electrolyte Balance  Outcome: Progressing  Intervention: Monitor and Manage Hypovolemia  Recent Flowsheet Documentation  Taken 7/1/2024 0800 by Freddy Castillo, RN  Fluid/Electrolyte Management: fluids adjusted  Goal: Absence of Infection Signs and Symptoms  Outcome: Progressing  Goal: Optimize Nutrition Status  Outcome: Progressing  Goal: Optimal Pain Control and Function  Outcome: Progressing     Problem: Infection  Goal: Absence of Infection Signs and Symptoms  Outcome: Progressing     "

## 2024-07-01 NOTE — DISCHARGE INSTRUCTIONS
HOME CARE FOLLOWING LAPAROSCOPIC SURGERY  NITIN Silverio, GRZEGORZ Rosado C. Pratt, J. Shaheen    Special instructions for Raji Barker:  --Surgery office appointment recommended with Dr. Ceballos or PA in 1-3 weeks for postoperative check.  Please contact the surgery office to arrange; phone number: [phone].  We are located at: [address], [address]; [place] [address]  --Please also feel free to call our Nurse Line if you have other questions/concerns prior to your appointment.     INCISIONAL CARE:  Replace the bandage over your incision(s)  DAILY until all drainage stops, or if more comfortable to have in place.  If present, leave the steri-strips (white paper tapes) in place for 14 days after surgery.  If you have staples in your incision at the time of discharge, they will be removed at your follow-up appointment.     BATHING:  OK to shower.  Avoid baths for 1 week after surgery or removal of drain.  You may wash your hair at any time.  Gently pat your incision dry after bathing.  Do not apply lotions, creams, or ointments to incisions.    ACTIVITY:  Light Activity -- you may immediately be up and about as tolerated.  Walking is encouraged, increase as tolerated.  Driving/Light Work-- when comfortable and off narcotic pain medications.  Strenuous Work/Activity -- limit lifting to 20 pounds for 2 weeks.  Progressively increase with time.  Active Sports (running, biking, etc.) -- cautiously resume after 2 weeks.    DISCOMFORT:  Begin taking pain pills before discomfort is severe.  Take the pain medication with some food, when possible, to minimize side effects.  Intermittent use of ice packs may help during the first 1-3 weeks after surgery.  Expect gradual improvement.    Over-the-counter anti-inflammatory medications (i.e. Ibuprofen/Advil/Motrin or Naprosyn/Aleve) may be used per package instructions in addition to or while tapering off the narcotic pain medications to  "decrease swelling and sensitivity.  DO NOT TAKE these Anti-inflammatory medications if your primary physician has advised against doing so, or if you have acid reflux, ulcer, or bleeding disorder, or take blood-thinner medications.  Call your primary physician or the surgery office if you have medication questions.    After laparoscopic appendectomy, you may have shoulder or upper back discomfort due to the gas used during surgery.  This is temporary and should resolve within 2-3 days.  Frequent short walks may help with this.  You may have decreased energy level for 1-2 weeks after surgery related to your recovery.      DIET:  Continue on a \"soft\" diet (i.e. cooked vegetables, soups, processed meats, light/white bread, mashed potatoes, rice, yogurt) for the first one to two week after discharge from the hospital.  After this time, you may return to diet you were on before surgery minimizing high cellulose foods and foods with \"skins\" (i.e.grapes, apple, citrus, corn) only.  This would include limiting: brussel sprouts, cabbage & kale, alfalfa sprouts, zucchini, squash, potatoes, carrots, and yams.  Drink plenty of fluids.    NAUSEA:  If nauseated from the anesthetic/pain meds; rest in bed, get up cautiously with assistance, and drink clear liquids (juice, tea, broth).    -CONTACT US IF THE FOLLOWING DEVELOPS:   1. A fever that is above 101     2. Increased redness, warmth, drainage, bleeding, or swelling.   3. Pain that is not relieved by rest/ice and your prescription.   4.  Increasing pain after 48 hours.   5. Drainage that is thick, cloudy, yellow, green or white.   6. Any other questions or concerns.      FREQUENTLY ASKED QUESTIONS:    Q:  How should my incision look?    A:  Normally your incision will appear slightly swollen with light redness directly along the incision itself as it heals.  It may feel like a bump or ridge as the healing/scarring happens, and over time (3-4 months) this bump or ridge feeling " should slowly go away.  In general, clear or pink watery drainage can be normal at first as your incision heals, but should decrease over time.    Q:  How do I know if my incision is infected?  A:  Look at your incision for signs of infection, like redness around the incision spreading to surrounding skin, or drainage of cloudy or foul-smelling drainage.  If you feel warm, check your temperature to see if you are running a fever.    **If any of these things occur, please notify the nurse at our office.  We may need you to come into the office for an incision check.      Q:  How do I take care of my incision?  A:  If you have a dressing in place - Starting the day after surgery, replace the dressing 1-2 times a day until there is no further drainage from the incision.  At that time, a dressing is no longer needed.  Try to minimize tape on the skin if irritation is occurring at the tape sites.  If you have significant irritation from tape on the skin, please call the office to discuss other method of dressing your incision.    Small pieces of tape called  steri-strips  may be present directly overlying your incision; these may be removed 10 days after surgery unless otherwise specified by your surgeon.  If these tapes start to loosen at the ends, you may trim them back until they fall off or are removed.    A:  If you had  Dermabond  tissue glue used as a dressing (this causes your incision to look shiny with a clear covering over it) - This type of dressing wears off with time and does not require more dressings over the top unless it is draining around the glue as it wears off.  Do not apply ointments or lotions over the incisions until the glue has completely worn off.    Q:  There is a piece of tape or a sticky  lead  still on my skin.  Can I remove this?  A:  Sometimes the sticky  leads  used for monitoring during surgery or for evaluation in the emergency department are not all removed while you are in the  hospital.  These sometimes have a tab or metal dot on them.  You can easily remove these on your own, like taking off a band-aid.  If there is a gel substance under the  lead , simply wipe/clean it off with a washcloth or paper towel.      Q:  What can I do to minimize constipation (very hard stools, or lack of stools)?  A:  Stay well hydrated.  Increase your dietary fiber intake or take a fiber supplement -with plenty of water.  Walk around frequently.  You may consider an over-the-counter stool-softener.  Your Pharmacist can assist you with choosing one that is stocked at your pharmacy.  Constipation is also one of the most common side effects of pain medication.  If you are using pain medication, be pro-active and try to PREVENT problems with constipation by taking the steps above BEFORE constipation becomes a problem.    Q:  What do I do if I need more pain medications?  A:  Call the office to receive refills.  Be aware that certain pain meds cannot be called into a pharmacy and actually require a paper prescription.  A change may be made in your pain med as you progress thru your recovery period or if you have side effects to certain meds.    --Pain meds are NOT refilled after 5pm on weekdays, and NOT AT ALL on the weekends, so please look ahead to prevent problems.    Q:  Why am I having a hard time sleeping now that I am at home?  A:  Many medications you receive while you are in the hospital can impact your sleep for a number of days after your surgery/hospitalization.  Decreased level of activity and naps during the day may also make sleeping at night difficult.  Try to minimize day-time naps, and get up frequently during the day to walk around your home during your recovery time.  Sleep aides may be of some help, but are not recommended for long-term use.      Q:  I am having some back discomfort.  What should I do?  A:  This may be related to certain positioning that was required for your surgery,  extended periods of time in bed, or other changes in your overall activity level.  You may try ice, heat, acetaminophen, or ibuprofen to treat this temporarily.  Note that many pain medications have acetaminophen in them and would state this on the prescription bottle.  Be sure not to exceed the maximum of 4000mg per day of acetaminophen.     **If the pain you are having does not resolve, is severe, or is a flare of back pain you have had on other occasions prior to surgery, please contact your primary physician for further recommendations or for an appointment to be examined at their office.    Q:  Why am I having headaches?  A:  Headaches can be caused by many things:  caffeine withdrawal, use of pain meds, dehydration, high blood pressure, lack of sleep, over-activity/exhaustion, flare-up of usual migraine headaches.  If you feel this is related to muscle tension (a band-like feeling around the head, or a pressure at the low-back of the head) you may try ice or heat to this area.  You may need to drink more fluids (try electrolyte drink like Gatorade), rest, or take your usual migraine medications.   **If your headaches do not resolve, worsen, are accompanied by other symptoms, or if your blood pressure is high, please call your primary physician for recommendation and/or examination.    Q:  I am unable to urinate.  What do I do?  A:  A small percentage of people can have difficulty urinating initially after surgery.  This includes being able to urinate only a very small amount at a time and feeling discomfort or pressure in the very low abdomen.  This is called  urinary retention , and is actually an urgent situation.  Proceed to your nearest Emergency department for evaluation (not an Urgent Care Center).  Sometimes the bladder does not work correctly after certain medications you receive during surgery, or related to certain procedures.  You may need to have a catheter placed until your bladder recovers.  When  planning to go to an Emergency department, it may help to call the ER to let them know you are coming in for this problem after a surgery.  This may help you get in quicker to be evaluated.  **If you have symptoms of a urinary tract infection, please contact your primary physician for the proper evaluation and treatment.        If you have other questions, please call the office Monday thru Friday between 8am and 4:30pm to discuss with the Nurse or Physician Assistant.  #[phone]    There is a surgeon ON CALL on weekday evenings and over the weekend in case of urgent need only, and may be contacted at the same number.    If you are having an emergency, call 911 or proceed to your nearest emergency department.

## 2024-07-01 NOTE — PROGRESS NOTES
[facility]   General Surgery Progress Note 7/1/2024         Assessment and Plan:   Assessment:   POD#2 s/p diagnostic laparoscopy, robotic-assisted lysis of adhesions for SBO      Plan:   -Diet: full liquids and can advance to low-res diet as tolerated  -Discontinue IV fluids  -Pain Mgmt: tylenol, ibuprofen and oxycodone prn  -Increase activity as tolerated  -VTE PPX: PCDs and ambulation  -IM signed off  -Dispo: Plan to discharge tonight after dinner if tolerates and continues to do well. Rx: oxycodone. Discharge instructions in chart. Follow up in clinic or phone 2-3 weeks.     Addendum  Seen and agree with above. Hopeful discharge tonight if tolerates diet advancement  Birgit Ceballos MD          Interval History:   Resting in bed, doing well. States he continues to feel better. Has some discomfort at incisions, controlled with tylenol. He has been up walking the halls. Tolerating clears. Voiding quite a bit and requests IV fluids to be stopped. He has had several BMs and passing flatus. Hoping to go home tonight.         Physical Exam:   Blood pressure (!) 140/74, pulse 66, temperature 97.8  F (36.6  C), temperature source Temporal, resp. rate 18, weight 102.8 kg (226 lb 10.1 oz), SpO2 97%.    I/O last 3 completed shifts:  In: 3004 [P.O.:565; I.V.:2439]  Out: 1750 [Urine:1750]    Abdomen: soft, ND, NT, +BS  Inc(s) - clean, dry, intact with steristrips, no erythema            Data:     Recent Labs   Lab Test 06/30/24  0952 06/28/24  0803 06/28/24  0121   HGB 13.7 15.2 15.8   WBC 8.2 7.6 13.6*          Matti Tian PA-C  Text page [phone] 8am-4pm  After 4pm call [phone]

## 2024-07-01 NOTE — PLAN OF CARE
"Goal Outcome Evaluation:      Plan of Care Reviewed With: patient    Overall Patient Progress: improvingOverall Patient Progress: improving    Outcome Evaluation: Clear liquid diet. Having bowel movements. Up ambulating when able. Pain controlled with PO medication.    Admit Date: 6/28/24  Admitting Diagnosis: SBO, Exp lap with Lysis of Adhesions  Pertinent History: None  Isolation Precautions:   None.    Neuro: Alert and Oriented x4  Activity: are SBA with no assistive devices   Telemetry Monitoring: No  Pain: complaining of 5/10 pain in their abdomen.  Tylenol and Oxycodone given for pain.  Medicatons decreased pain. Pain rating down to a 2/10.  GI: Abdomen rounded, tender, with hypoactive bowel sounds. Lap sites X5 - steri-strips intact. Had 1 BM (soft, brown) this shift (3963-1878)  : Voiding in adequate amounts  LDA's: Peripheral  Fluids: has D5 1/2NS running at 125 mL per hour.  Diet: Clear liquid  Living Situation:   Consults: General Surgery  Discharge Disposition: Home with Homecare    Plan of Care:  Pt slept well throughout the night. Continued to educated about IS use and CADB exercises with splinting, ambulation, and medication side effects.     Problem: Adult Inpatient Plan of Care  Goal: Plan of Care Review  Description: The Plan of Care Review/Shift note should be completed every shift.  The Outcome Evaluation is a brief statement about your assessment that the patient is improving, declining, or no change.  This information will be displayed automatically on your shift  note.  Outcome: Progressing  Flowsheets (Taken 7/1/2024 0246)  Outcome Evaluation: Clear liquid diet. Having bowel movements. Up ambulating when able. Pain controlled with PO medication.  Plan of Care Reviewed With: patient  Overall Patient Progress: improving  Goal: Patient-Specific Goal (Individualized)  Description: You can add care plan individualizations to a care plan. Examples of Individualization might be:  \"Parent requests " "to be called daily at 9am for status\", \"I have a hard time hearing out of my right ear\", or \"Do not touch me to wake me up as it startles  me\".  Outcome: Progressing  Goal: Absence of Hospital-Acquired Illness or Injury  Outcome: Progressing  Intervention: Identify and Manage Fall Risk  Recent Flowsheet Documentation  Taken 7/1/2024 0051 by Christina Sood RN  Safety Promotion/Fall Prevention:   clutter free environment maintained   room organization consistent   safety round/check completed  Intervention: Prevent Skin Injury  Recent Flowsheet Documentation  Taken 7/1/2024 0051 by Christina Sood RN  Body Position: position changed independently  Skin Protection: adhesive use limited  Device Skin Pressure Protection: positioning supports utilized  Intervention: Prevent and Manage VTE (Venous Thromboembolism) Risk  Recent Flowsheet Documentation  Taken 7/1/2024 0051 by Christina Sood RN  VTE Prevention/Management: SCDs on (sequential compression devices)  Intervention: Prevent Infection  Recent Flowsheet Documentation  Taken 7/1/2024 0051 by Christina Sood RN  Infection Prevention: single patient room provided  Goal: Optimal Comfort and Wellbeing  Outcome: Progressing  Intervention: Monitor Pain and Promote Comfort  Recent Flowsheet Documentation  Taken 7/1/2024 0051 by Christina Sood RN  Pain Management Interventions: medication (see MAR)  Goal: Readiness for Transition of Care  Outcome: Progressing     Problem: Pain Acute  Goal: Optimal Pain Control and Function  Outcome: Progressing  Intervention: Develop Pain Management Plan  Recent Flowsheet Documentation  Taken 7/1/2024 0051 by Christina Sood RN  Pain Management Interventions: medication (see MAR)  Intervention: Prevent or Manage Pain  Recent Flowsheet Documentation  Taken 7/1/2024 0051 by Christina Sood RN  Sensory Stimulation Regulation:   care clustered   lighting decreased   quiet environment promoted  Sleep/Rest Enhancement:   awakenings " minimized   regular sleep/rest pattern promoted  Bowel Elimination Promotion:   ambulation promoted   adequate fluid intake promoted  Medication Review/Management: medications reviewed  Intervention: Optimize Psychosocial Wellbeing  Recent Flowsheet Documentation  Taken 7/1/2024 0051 by Christina Sood RN  Supportive Measures:   active listening utilized   relaxation techniques promoted       Problem: Intestinal Obstruction  Goal: Optimal Bowel Function  Outcome: Progressing  Intervention: Promote Bowel Function  Recent Flowsheet Documentation  Taken 7/1/2024 0051 by Christina Sood RN  Body Position: position changed independently  Goal: Fluid and Electrolyte Balance  Outcome: Progressing  Intervention: Monitor and Manage Hypovolemia  Recent Flowsheet Documentation  Taken 7/1/2024 0051 by Christina Sood RN  Fluid/Electrolyte Management: fluids adjusted  Goal: Absence of Infection Signs and Symptoms  Outcome: Progressing  Intervention: Prevent or Manage Infection  Recent Flowsheet Documentation  Taken 7/1/2024 0051 by Christina Sood RN  Infection Management: aseptic technique maintained  Goal: Optimize Nutrition Status  Outcome: Progressing  Goal: Optimal Pain Control and Function  Outcome: Progressing  Intervention: Prevent or Manage Pain  Recent Flowsheet Documentation  Taken 7/1/2024 0051 by Christina Sood RN  Pain Management Interventions: medication (see MAR)  Sleep/Rest Enhancement:   awakenings minimized   regular sleep/rest pattern promoted     Problem: Infection  Goal: Absence of Infection Signs and Symptoms  Outcome: Progressing  Intervention: Prevent or Manage Infection  Recent Flowsheet Documentation  Taken 7/1/2024 0051 by Christina Sood RN  Infection Management: aseptic technique maintained       Problem: Surgery Nonspecified  Goal: Absence of Bleeding  Outcome: Progressing  Intervention: Monitor and Manage Bleeding  Recent Flowsheet Documentation  Taken 7/1/2024 0051 by Christina Sood  CLINTON ALCARAZ  Bleeding Management: dressing monitored  Goal: Effective Bowel Elimination  Outcome: Progressing  Intervention: Enhance Bowel Motility and Elimination  Recent Flowsheet Documentation  Taken 7/1/2024 0051 by Christina Sood RN  Bowel Motility Enhancement:   ambulation promoted   fluid intake encouraged  Bowel Elimination Management: relaxation techniques promoted  Goal: Fluid and Electrolyte Balance  Outcome: Progressing  Intervention: Monitor and Manage Fluid and Electrolyte Balance  Recent Flowsheet Documentation  Taken 7/1/2024 0051 by Christina Sood RN  Fluid/Electrolyte Management: fluids adjusted  Goal: Blood Glucose Level Within Targeted Range  Outcome: Progressing  Goal: Absence of Infection Signs and Symptoms  Outcome: Progressing  Intervention: Prevent or Manage Infection  Recent Flowsheet Documentation  Taken 7/1/2024 0051 by Christina Sood RN  Infection Management: aseptic technique maintained  Goal: Anesthesia/Sedation Recovery  Outcome: Progressing  Intervention: Optimize Anesthesia Recovery  Recent Flowsheet Documentation  Taken 7/1/2024 0051 by Christina Sood RN  Safety Promotion/Fall Prevention:   clutter free environment maintained   room organization consistent   safety round/check completed  Administration (IS): instruction provided, follow-up  Goal: Optimal Pain Control and Function  Outcome: Progressing  Intervention: Prevent or Manage Pain  Recent Flowsheet Documentation  Taken 7/1/2024 0051 by Christina Sood RN  Pain Management Interventions: medication (see MAR)  Goal: Nausea and Vomiting Relief  Outcome: Progressing  Goal: Effective Urinary Elimination  Outcome: Progressing  Intervention: Monitor and Manage Urinary Retention  Recent Flowsheet Documentation  Taken 7/1/2024 0051 by Christina Sood RN  Urinary Elimination Promotion:   voiding relaxation promoted   toileting offered  Goal: Effective Oxygenation and Ventilation  Outcome: Progressing

## 2024-07-02 NOTE — DISCHARGE SUMMARY
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 He agreed to proceed with robotic lysis of adhesions, possible laparotomy after hearing the risks and benefits.      Hospital Course: Raji Barker underwent robotic assisted laparoscopic lysis of adhesions  without complications. Please see op note for further details. The patient had a routine hospital course and recovered as anticipated. By POD #2, he had advanced to a regular diet and was transitioned successfully to oral pain medications. He was  ambulating independently and voiding spontaneously. By day of discharge, he remained afebrile, was tolerating an oral diet, had adequate pain control on oral medications and was ambulating independently thus medically appropriate for discharge to home.     Inpatient Consultations: No consultations were requested during this admission    Procedures:   Procedure(s):  LYSIS, ADHESIONS, ROBOT-ASSISTED     Disposition:   Discharged to home     Discharge Condition  Discharge condition: Stable   Discharge vitals: Blood pressure 109/67, pulse 63, temperature 97.7  F (36.5  C), temperature source Temporal, resp. rate 18, weight 102.8 kg (226 lb 10.1 oz), SpO2 97%.     Discharge Medications:   Discharge Medication List as of 7/1/2024  4:54 PM        START taking these medications    Details   oxyCODONE (ROXICODONE) 5 MG tablet Take 1 tablet (5 mg) by mouth  every 6 hours as needed for moderate pain, Disp-8 tablet, R-0, E-Prescribe           CONTINUE these medications which have NOT CHANGED    Details   ibuprofen (ADVIL/MOTRIN) 200 MG tablet Take 200 mg by mouth as needed for pain, Historical      omeprazole (PRILOSEC) 20 MG DR capsule Take 20 mg by mouth daily as needed, Historical             Discharge Instructions:     HOME CARE   NITIN Silverio, GRZEGORZ Rosado, CHALINO Meza, ELIZABETH Ceballos      FOLLOW-UP AFTER SURGERY:  -Our office will contact you approximately 2-3 weeks after surgery to check on your progress and answer any questions you may have.  If you are doing well, you will not need to return for an office appointment.  If any concerns are identified over the phone, we will help you make an appointment to see a provider.    -If you have not received a phone call, have any questions or concerns, or would like to be seen, please call us at [phone].  We are located at: [address], [address]; [place] [address]         I did not personally see or examine the patient on the day of discharge.  Summary was completed by chart review.   Basia Castillo PA-C

## 2024-07-15 ENCOUNTER — TELEPHONE (OUTPATIENT)
Dept: SURGERY | Facility: CLINIC | Age: 34
End: 2024-07-15
Payer: COMMERCIAL

## 2024-07-15 NOTE — TELEPHONE ENCOUNTER
[facility] Postoperative Call Note:     Raji Barker was called for an update regarding his recovery. He underwent a  robotic-assisted laparoscopic lysis of adhesions  by Dr. Ceballos on 6/29/24. Today he tells me he is doing well. He has some slight dyspepsia which may be from meds. He is eating a normal diet and his bowels are alternating loose and normal. He states his wounds are healing well and the steristrips have been removed.    Patient was instructed to slowly and gradually resume all normal activities.The patient states all of his questions were answered. He understands our discussion. He agrees to follow up as needed or to call our office with any concerns.    Matti Tian PA-C      Please route or send letter to:  Primary Care Provider (PCP)

## 2024-07-28 ENCOUNTER — HEALTH MAINTENANCE LETTER (OUTPATIENT)
Age: 34
End: 2024-07-28

## 2025-08-10 ENCOUNTER — HEALTH MAINTENANCE LETTER (OUTPATIENT)
Age: 35
End: 2025-08-10

## (undated) DEVICE — GLOVE BIOGEL PI MICRO INDICATOR UNDERGLOVE SZ 7.0 48970

## (undated) DEVICE — ESU CORD MONOPOLAR 10'  E0510

## (undated) DEVICE — LINEN FULL SHEET 5511

## (undated) DEVICE — SPONGE RAY-TEC 3X3" 30-094

## (undated) DEVICE — BAG CLEAR TRASH 1.3M 39X33" P4040C

## (undated) DEVICE — CATH TRAY FOLEY SURESTEP 16FR DRAIN BAG STATOCK A899916

## (undated) DEVICE — BLADE KNIFE SURG 11 371111

## (undated) DEVICE — LUBRICANT INST ELECTROLUBE EL101

## (undated) DEVICE — SU VICRYL 4-0 PS-2 18" UND J496H

## (undated) DEVICE — SOL NACL 0.9% IRRIG 3000ML BAG 2B7477

## (undated) DEVICE — DAVINCI XI SUCTION IRRIGATOR ENDOWRIST 480299

## (undated) DEVICE — SUCTION MANIFOLD NEPTUNE 2 SYS 4 PORT 0702-020-000

## (undated) DEVICE — DAVINCI XI DRAPE ARM 470015

## (undated) DEVICE — SU VICRYL 3-0 SH 27" J316H

## (undated) DEVICE — ESU PENCIL W/HOLSTER E2350H

## (undated) DEVICE — GOWN IMPERVIOUS ZONED XLG 9041

## (undated) DEVICE — ENDO TROCAR FIRST ENTRY KII FIOS Z-THRD 05X100MM CTF03

## (undated) DEVICE — DAVINCI HOT SHEARS TIP COVER  400180

## (undated) DEVICE — DAVINCI XI SEAL UNIVERSAL 5-8MM 470361

## (undated) DEVICE — GLOVE BIOGEL PI MICRO SZ 6.5 48565

## (undated) DEVICE — DAVINCI XI OBTURATOR BLADELESS 8MM 470359

## (undated) DEVICE — ESU GROUND PAD ADULT W/CORD E7507

## (undated) DEVICE — Device

## (undated) DEVICE — LINEN HALF SHEET 5512

## (undated) DEVICE — LINEN POUCH DBL 5427

## (undated) DEVICE — BLADE CLIPPER 4406

## (undated) DEVICE — ESU ELEC BLADE 2.75" COATED/INSULATED E1455

## (undated) DEVICE — DAVINCI XI DRAPE COLUMN 470341

## (undated) RX ORDER — BUPIVACAINE HYDROCHLORIDE 5 MG/ML
INJECTION, SOLUTION EPIDURAL; INTRACAUDAL
Status: DISPENSED
Start: 2024-06-29

## (undated) RX ORDER — ONDANSETRON 2 MG/ML
INJECTION INTRAMUSCULAR; INTRAVENOUS
Status: DISPENSED
Start: 2024-06-29

## (undated) RX ORDER — CEFAZOLIN SODIUM/WATER 2 G/20 ML
SYRINGE (ML) INTRAVENOUS
Status: DISPENSED
Start: 2024-06-29

## (undated) RX ORDER — PROPOFOL 10 MG/ML
INJECTION, EMULSION INTRAVENOUS
Status: DISPENSED
Start: 2024-06-29

## (undated) RX ORDER — FENTANYL CITRATE 50 UG/ML
INJECTION, SOLUTION INTRAMUSCULAR; INTRAVENOUS
Status: DISPENSED
Start: 2024-06-29